# Patient Record
Sex: MALE | Race: WHITE | NOT HISPANIC OR LATINO | Employment: OTHER | URBAN - NONMETROPOLITAN AREA
[De-identification: names, ages, dates, MRNs, and addresses within clinical notes are randomized per-mention and may not be internally consistent; named-entity substitution may affect disease eponyms.]

---

## 2017-01-13 DIAGNOSIS — Z96.642 HISTORY OF TOTAL HIP ARTHROPLASTY, LEFT: Primary | ICD-10-CM

## 2017-01-16 ENCOUNTER — HOSPITAL ENCOUNTER (OUTPATIENT)
Dept: GENERAL RADIOLOGY | Facility: HOSPITAL | Age: 77
Discharge: HOME | End: 2017-01-16
Attending: ORTHOPAEDIC SURGERY | Admitting: ORTHOPAEDIC SURGERY

## 2017-01-16 ENCOUNTER — OFFICE VISIT (OUTPATIENT)
Dept: ORTHOPEDIC SURGERY | Facility: CLINIC | Age: 77
End: 2017-01-16

## 2017-01-16 DIAGNOSIS — Z96.642 HISTORY OF TOTAL HIP ARTHROPLASTY, LEFT: ICD-10-CM

## 2017-01-16 DIAGNOSIS — Z96.641 STATUS POST TOTAL REPLACEMENT OF RIGHT HIP: Primary | ICD-10-CM

## 2017-01-16 DIAGNOSIS — M25.561 KNEE PAIN, RIGHT: ICD-10-CM

## 2017-01-16 DIAGNOSIS — M17.11 PRIMARY OSTEOARTHRITIS OF RIGHT KNEE: ICD-10-CM

## 2017-01-16 PROBLEM — M17.12 PRIMARY OSTEOARTHRITIS OF LEFT KNEE: Status: ACTIVE | Noted: 2017-01-16

## 2017-01-16 PROCEDURE — 73560 X-RAY EXAM OF KNEE 1 OR 2: CPT

## 2017-01-16 PROCEDURE — 99213 OFFICE O/P EST LOW 20 MIN: CPT | Performed by: ORTHOPAEDIC SURGERY

## 2017-01-16 PROCEDURE — 73560 X-RAY EXAM OF KNEE 1 OR 2: CPT | Performed by: RADIOLOGY

## 2017-01-16 NOTE — MR AVS SNAPSHOT
Dustin Jordan   1/16/2017 1:10 PM   Office Visit    Dept Phone:  [phone]   Encounter #:  [id number]    Provider:  Yoel Menjivar MD   Department:  [facility]                Your Full Care Plan              Your Updated Medication List          This list is accurate as of: 1/16/17  1:46 PM.  Always use your most recent med list.                aspirin 81 MG chewable tablet       cetirizine 10 MG tablet   Commonly known as:  zyrTEC       cholecalciferol 400 UNITS tablet   Commonly known as:  VITAMIN D3       fish oil 1000 MG capsule capsule       * HYDROcodone-acetaminophen 7.5-325 MG per tablet   Commonly known as:  NORCO   Take 1 tablet by mouth every 6 (six) hours as needed for pain       * HYDROcodone-acetaminophen 7.5-325 MG per tablet   Commonly known as:  NORCO   Take 1 tablet by mouth every 6 (six) hours as needed for moderate pain (4-6) for up to 40 doses.       metoprolol tartrate 50 MG tablet   Commonly known as:  LOPRESSOR       pantoprazole 40 MG EC tablet   Commonly known as:  PROTONIX       VITEYES AREDS FORMULA/LUTEIN capsule       * Notice:  This list has 2 medication(s) that are the same as other medications prescribed for you. Read the directions carefully, and ask your doctor or other care provider to review them with you.            You Were Diagnosed With        Codes Comments    Status post total replacement of right hip    -  Primary ICD-10-CM: Z96.641  ICD-9-CM: V43.64     Primary osteoarthritis of right knee     ICD-10-CM: M17.11  ICD-9-CM: 715.16       Instructions     None    Patient Instructions History      Upcoming Appointments     Visit Type Date Time Department    FOLLOW UP 1/16/2017  1:10 PM [facility] [place]    XR COR HIP W OR WO PEL 2-3 VIEW LEFT 1/16/2017  1:30 PM [facility]      MyChart Signup     Our records indicate that your [facility] [employer] account has been deactivated. If you would like to  reactivate your account, please email [email or web address] or call [phone] to talk to our [employer] staff.             Other Info from Your Visit           Allergies     No Known Allergies      Reason for Visit     Left Hip - Follow-up     Hip Pain           Vital Signs     Smoking Status                   Never Smoker           Problems and Diagnoses Noted     Degenerative joint disease of lower leg    Status post total replacement of right hip    -  Primary

## 2017-01-16 NOTE — PROGRESS NOTES
Patient: Dustin Jordan  YOB: 1940  Date of Encounter: 01/16/2017        History of Present Illness:     76-year-old white male now 5 months status post left total hip arthroplasty presents today with complaints of several week history of right knee pain with swelling and decreased range of motion. He reports that his left hip is doing well and he is ambulating great with this without complication. He reports that this has alleviated his back pain as well. He states that without acute injury that his right knee began to bother him. He feels as if this is secondary to favoring it while recovering from the hip surgery. He states that for several weeks he had reduction of range of motion and obvious swelling with inability to fully straighten his knee. He states that he has noticed over the last several years that he has had varus deformity or bowing of the lateral knees. He reports pain to the medial aspect of his knee which is significantly better than what it was in the past. He reports a history of an open meniscectomy to the right knee in the 1970s. He denies acute pain today of the right knee.    Physical Exam: 76 y.o. male .  General Appearance:    Well appearing, cooperative, in no acute distress.       Patient is alert and oriented x 3.          Musculoskeletal:   Right Knee Exam   Swelling: None  Effusion: Yes    Tenderness   The patient is experiencing tenderness in the medial joint line.    Range of Motion   Extension: -5  Flexion:     Normal    Muscle Strength   Normal right knee strength    Tests   McMurrays:  Medial - Negative        Lachman:  Anterior - Negative      Drawer:       Anterior - Negative      Varus:  Negative  Valgus: Negative           Radiology:       Xrays: AP, lateral right knee reveal advanced degenerative changes, narrowing of the medial joint space with tricompartmental osteophytes. No acute fractures or dislocations.     Assessment:    ICD-10-CM ICD-9-CM   1. Status  post total replacement of right hip Z96.641 V43.64   2. Primary osteoarthritis of right knee M17.11 715.16       Plan:    Discussed options in regards to conservative Depo-Medrol injection versus Synvisc one injection. He is doing better symptomatically and would like to forego any treatment at this point. He will return back upon returning of his pain and symptoms. He will return upon deciding to undergo viscous supplementation.       Discussion and Summary:    Written by Khadar Espana PA-C, acting as scribe for Dr. Tiara CHINO, Dr. Menjivar personally performed the services described in this documentation, as scribed by Khadar Espana PA-C, in my presence, and is both accurate and complete.      This document was signed by Khadar Espana PA-C January 16, 2017 1:35 PM

## 2021-01-26 ENCOUNTER — IMMUNIZATION (OUTPATIENT)
Dept: VACCINE CLINIC | Facility: HOSPITAL | Age: 81
End: 2021-01-26

## 2021-01-26 PROCEDURE — 0001A: CPT | Performed by: FAMILY MEDICINE

## 2021-01-26 PROCEDURE — 91300 HC SARSCOV02 VAC 30MCG/0.3ML IM: CPT | Performed by: FAMILY MEDICINE

## 2021-02-16 ENCOUNTER — APPOINTMENT (OUTPATIENT)
Dept: VACCINE CLINIC | Facility: HOSPITAL | Age: 81
End: 2021-02-16

## 2021-02-17 ENCOUNTER — IMMUNIZATION (OUTPATIENT)
Dept: VACCINE CLINIC | Facility: HOSPITAL | Age: 81
End: 2021-02-17

## 2021-02-17 PROCEDURE — 0002A: CPT | Performed by: INTERNAL MEDICINE

## 2021-02-17 PROCEDURE — 91300 HC SARSCOV02 VAC 30MCG/0.3ML IM: CPT | Performed by: INTERNAL MEDICINE

## 2021-10-05 DIAGNOSIS — M25.552 LEFT HIP PAIN: Primary | ICD-10-CM

## 2021-10-18 ENCOUNTER — OFFICE VISIT (OUTPATIENT)
Dept: UROLOGY | Facility: CLINIC | Age: 81
End: 2021-10-18

## 2021-10-18 VITALS — BODY MASS INDEX: 23.48 KG/M2 | WEIGHT: 164 LBS | HEIGHT: 70 IN

## 2021-10-18 DIAGNOSIS — R35.0 FREQUENCY OF MICTURITION: Primary | ICD-10-CM

## 2021-10-18 DIAGNOSIS — R31.29 MICROSCOPIC HEMATURIA: ICD-10-CM

## 2021-10-18 LAB
BILIRUB BLD-MCNC: NEGATIVE MG/DL
CLARITY, POC: CLEAR
COLOR UR: YELLOW
EXPIRATION DATE: NORMAL
GLUCOSE UR STRIP-MCNC: NEGATIVE MG/DL
KETONES UR QL: NEGATIVE
LEUKOCYTE EST, POC: NEGATIVE
Lab: 981
NITRITE UR-MCNC: NEGATIVE MG/ML
PH UR: 6 [PH] (ref 5–8)
PROT UR STRIP-MCNC: NEGATIVE MG/DL
RBC # UR STRIP: NEGATIVE /UL
SP GR UR: 1.02 (ref 1–1.03)
UROBILINOGEN UR QL: NORMAL

## 2021-10-18 PROCEDURE — 81003 URINALYSIS AUTO W/O SCOPE: CPT | Performed by: UROLOGY

## 2021-10-18 PROCEDURE — 99203 OFFICE O/P NEW LOW 30 MIN: CPT | Performed by: UROLOGY

## 2021-10-18 NOTE — PROGRESS NOTES
Chief Complaint:          Chief Complaint   Patient presents with   • Blood in Urine     New pt       HPI:   81 y.o. male is a new patient referred with hematuria.  He is on 81 mg of aspirin.  He reports no lower urinary tract symptomatology.  The blood was there 2 months ago and 6 months ago.  He quit tobacco years ago.  Today's urine is negative.  I told him at least to get a upper tract study to be sure were not dealing with any Neoplasia particularly renal neoplasia and I will see him back based on this his exams unremarkable.  He reports no lower urinary tract symptomatology particularly irritative symptoms such as frequency urgency dysuria and obstructive symptomatology particularly dribbling, hesitancy, intermittency.      Past Medical History:        Past Medical History:   Diagnosis Date   • Acid reflux    • Arthritis    • Environmental allergies    • Hypertension     STATES NO HIGH BLOOD PRESSURE. BUT XTRA VEIN IN HEART   • Kidney stones          Current Meds:     Current Outpatient Medications   Medication Sig Dispense Refill   • aspirin 81 MG chewable tablet Chew 81 mg daily.     • cetirizine (ZyrTEC) 10 MG tablet TK 1 T PO QD  3   • cholecalciferol (VITAMIN D3) 400 UNITS tablet Take 400 Units by mouth daily.     • metoprolol tartrate (LOPRESSOR) 50 MG tablet Take 25 mg by mouth 2 (two) times a day.     • Multiple Vitamins-Minerals (VITEYES AREDS FORMULA/LUTEIN) capsule Take 1 capsule by mouth daily.     • Omega-3 Fatty Acids (FISH OIL) 1000 MG capsule capsule Take 1,000 mg by mouth 2 (two) times a day with meals.       No current facility-administered medications for this visit.        Allergies:      No Known Allergies     Past Surgical History:     Past Surgical History:   Procedure Laterality Date   • ESOPHAGEAL DILATATION     • HERNIA REPAIR     • KNEE ARTHROSCOPY Right    • KNEE SURGERY     • TOTAL HIP ARTHROPLASTY Left 8/9/2016    Procedure: TOTAL HIP ARTHROPLASTY;  Surgeon: Yoel Menjivar,  MD;  Location: [facility];  Service:    • TOTAL HIP ARTHROPLASTY           Social History:     Social History     Socioeconomic History   • Marital status:    Tobacco Use   • Smoking status: Never Smoker   • Smokeless tobacco: Current User     Types: Chew   Substance and Sexual Activity   • Alcohol use: No   • Drug use: No   • Sexual activity: Defer       Family History:     Family History   Problem Relation Age of Onset   • Osteoporosis Sister    • Heart disease Brother        Review of Systems:     Review of Systems   Constitutional: Negative.    HENT: Negative.    Eyes: Negative.    Respiratory: Negative.    Cardiovascular: Negative.    Gastrointestinal: Negative.    Endocrine: Negative.    Musculoskeletal: Negative.    Allergic/Immunologic: Negative.    Neurological: Negative.    Hematological: Negative.    Psychiatric/Behavioral: Negative.        Physical Exam:     Physical Exam  Vitals and nursing note reviewed.   Constitutional:       Appearance: He is well-developed.   HENT:      Head: Normocephalic and atraumatic.   Eyes:      Conjunctiva/sclera: Conjunctivae normal.      Pupils: Pupils are equal, round, and reactive to light.   Cardiovascular:      Rate and Rhythm: Normal rate and regular rhythm.      Heart sounds: Normal heart sounds.   Pulmonary:      Effort: Pulmonary effort is normal.      Breath sounds: Normal breath sounds.   Abdominal:      General: Bowel sounds are normal.      Palpations: Abdomen is soft.   Musculoskeletal:         General: Normal range of motion.      Cervical back: Normal range of motion.   Skin:     General: Skin is warm and dry.   Neurological:      Mental Status: He is alert and oriented to person, place, and time.      Deep Tendon Reflexes: Reflexes are normal and symmetric.   Psychiatric:         Behavior: Behavior normal.         Thought Content: Thought content normal.         Judgment: Judgment normal.         I have reviewed the following portions of the patient's  history: allergies, current medications, past family history, past medical history, past social history, past surgical history, problem list and ROS and confirm it's accurate.      Procedure:       Assessment/Plan:   Hematuria-patient was diagnosed with hematuria.  We discussed the significance of microscopic hematuria versus gross hematuria.  We discussed the presence or absence of the type of clotting identified including vermiform clots consistent with ureteral bleeding versus just pink tinged urine versus selena clots.  We discussed the presence of urokinase in the urine which causes the clots to dissolve with time.  We discussed the fact that it takes only a very small amount of blood in the urine to make the urine very red appearing and therefore give one the impression that there is much more blood loss that is really present.  He discussed the use of both an upper and lower tract investigation.  I discussed the fact that an upper tract investigation includes a normal renal ultrasound with a significant risks of missing more subtle lesions.  Progressing to a CT scan without contrast and finally the CT scan with contrast being the gold standard to diagnose the small neoplasms.  We discussed the lower tract investigation consisting of a cystoscopy in many of the cases where the upper tract study is negative.  Also discussed the fact that if there is a contraindication to the use of contrast we would do a noncontrasted study and this also has a chance of missing small lesions.  The specific instance would be cases of diabetes and chronic renal insufficiency.  Discussed the fact that there is about a 96% chance of a negative workup with episodes of microscopic hematuria and with much greater in the face of gross hematuria.  We discussed the fact that this is a non-cumulative test.  In other words if there is hematuria next year I would recommend continuing to work up the condition because of the fact that  neoplasms may be small at the first workup and easily are missed.  I discussed the differential diagnosis of hematuria including trauma, neoplasia, infection, etc.  We discussed the fact that if there is any history of chronic kidney disease or risk factors such as diabetes for contrast a noncontrasted study will be utilized.  We will initiate an investigation.  He had microscopic hematuria on 2 occasions today's urine is negative I will make sure the upper tract is unremarkable I will follow up with him based on this.                  This document has been electronically signed by JYOTI BURROUGHS MD October 18, 2021 13:54 EDT

## 2021-10-20 PROBLEM — R31.29 MICROSCOPIC HEMATURIA: Status: ACTIVE | Noted: 2021-10-20

## 2021-10-25 ENCOUNTER — PATIENT ROUNDING (BHMG ONLY) (OUTPATIENT)
Dept: ORTHOPEDIC SURGERY | Facility: CLINIC | Age: 81
End: 2021-10-25

## 2021-10-25 ENCOUNTER — OFFICE VISIT (OUTPATIENT)
Dept: ORTHOPEDIC SURGERY | Facility: CLINIC | Age: 81
End: 2021-10-25

## 2021-10-25 ENCOUNTER — HOSPITAL ENCOUNTER (OUTPATIENT)
Dept: GENERAL RADIOLOGY | Facility: HOSPITAL | Age: 81
Discharge: HOME | End: 2021-10-25
Admitting: ORTHOPAEDIC SURGERY

## 2021-10-25 VITALS
DIASTOLIC BLOOD PRESSURE: 74 MMHG | BODY MASS INDEX: 23.48 KG/M2 | SYSTOLIC BLOOD PRESSURE: 144 MMHG | WEIGHT: 164.02 LBS | HEART RATE: 61 BPM | HEIGHT: 70 IN

## 2021-10-25 DIAGNOSIS — M25.552 LEFT HIP PAIN: ICD-10-CM

## 2021-10-25 DIAGNOSIS — Z96.642 HISTORY OF TOTAL HIP ARTHROPLASTY, LEFT: Primary | ICD-10-CM

## 2021-10-25 DIAGNOSIS — M70.62 GREATER TROCHANTERIC BURSITIS, LEFT: ICD-10-CM

## 2021-10-25 PROCEDURE — 73502 X-RAY EXAM HIP UNI 2-3 VIEWS: CPT | Performed by: RADIOLOGY

## 2021-10-25 PROCEDURE — 73502 X-RAY EXAM HIP UNI 2-3 VIEWS: CPT

## 2021-10-25 PROCEDURE — 99203 OFFICE O/P NEW LOW 30 MIN: CPT | Performed by: ORTHOPAEDIC SURGERY

## 2021-10-25 RX ORDER — NAPROXEN SODIUM 220 MG
220 TABLET ORAL 2 TIMES DAILY PRN
COMMUNITY
End: 2022-02-03

## 2021-10-25 NOTE — PROGRESS NOTES
New Patient Visit      Patient: Dustin Jordan  YOB: 1940  Date of Encounter: 10/25/2021        Chief Complaint:   Chief Complaint   Patient presents with   • Left Hip - Pain, Initial Evaluation           HPI:   Dustin Jordan, 81 y.o. male, referred by Katt Rand APRN presents with lateral left hip pain after sitting for a while and getting up.  He is known to have chronic low back pain occasionally experiences pain anterior aspect of his left hip.  Underwent left total hip arthroplasty August 2016 he has done extremely well his left hip with current symptoms his only complaint.  He is known to have advanced arthritis of his left knee.      Active Problem List:  Patient Active Problem List   Diagnosis   • Hip pain, left   • Hypertension   • Acid reflux   • Environmental allergies   • Primary osteoarthritis of right knee   • Microscopic hematuria   • History of total hip arthroplasty, left         Past Medical History:  Past Medical History:   Diagnosis Date   • Acid reflux    • Arthritis    • Environmental allergies    • Hypertension     STATES NO HIGH BLOOD PRESSURE. BUT XTRA VEIN IN HEART   • Kidney stones          Past Surgical History:  Past Surgical History:   Procedure Laterality Date   • ESOPHAGEAL DILATATION     • HERNIA REPAIR     • KNEE ARTHROSCOPY Right    • KNEE SURGERY     • TOTAL HIP ARTHROPLASTY Left 8/9/2016    Procedure: TOTAL HIP ARTHROPLASTY;  Surgeon: Yoel Menjivar MD;  Location: [facility];  Service:    • TOTAL HIP ARTHROPLASTY           Family History:  Family History   Problem Relation Age of Onset   • Osteoporosis Sister    • Heart disease Brother          Social History:  Social History     Socioeconomic History   • Marital status:    Tobacco Use   • Smoking status: Never Smoker   • Smokeless tobacco: Current User     Types: Chew   Substance and Sexual Activity   • Alcohol use: No   • Drug use: No   • Sexual activity: Defer     Body mass index is 23.53  "kg/m².      Medications:  Current Outpatient Medications   Medication Sig Dispense Refill   • aspirin 81 MG chewable tablet Chew 162 mg Daily.     • cetirizine (ZyrTEC) 10 MG tablet TK 1 T PO QD  3   • cholecalciferol (VITAMIN D3) 400 UNITS tablet Take 400 Units by mouth daily.     • metoprolol tartrate (LOPRESSOR) 50 MG tablet Take 25 mg by mouth 2 (two) times a day.     • Multiple Vitamins-Minerals (VITEYES AREDS FORMULA/LUTEIN) capsule Take 1 capsule by mouth daily.     • naproxen sodium (ALEVE) 220 MG tablet Take 220 mg by mouth 2 (Two) Times a Day As Needed.     • Omega-3 Fatty Acids (FISH OIL) 1000 MG capsule capsule Take 1,000 mg by mouth 2 (two) times a day with meals.     • Probiotic Product (PROBIOTIC-10 PO) Take  by mouth.       No current facility-administered medications for this visit.         Allergies:  No Known Allergies      Review of Systems:   Review of Systems   Constitutional: Negative.    HENT: Negative.    Eyes: Negative.    Respiratory: Negative.    Cardiovascular: Negative.    Gastrointestinal: Negative.    Endocrine: Negative.    Genitourinary: Negative.    Musculoskeletal: Positive for arthralgias.   Skin: Negative.    Allergic/Immunologic: Negative.    Neurological: Negative.    Hematological: Negative.    Psychiatric/Behavioral: Negative.          Physical Exam:   Physical Exam  GENERAL: 81 y.o. male, alert and oriented X 3 in no acute distress.   Visit Vitals  /74   Pulse 61   Ht 177.8 cm (70\")   Wt 74.4 kg (164 lb 0.4 oz)   BMI 23.53 kg/m²         Musculoskeletal:   Examination left hip demonstrates full motion with no pain full internal and external rotation.  Straight leg raising are negative DIGNA test is negative he has mild tenderness in the region of the left greater trochanteric bursa to palpation.        Radiology/Labs:     XR Hip With or Without Pelvis 2 - 3 View Left    Result Date: 10/25/2021  1. Left hip prosthesis stable 2. Arthritic change in the right hip  This " report was finalized on 10/25/2021 1:22 PM by Dr. Alberto Fong MD.      Radiographs left hip by my review show total hip arthroplasty good position alignment without evidence of loosening.      Assessment & Plan:   81 y.o. male presents with relatively mild lateral left hip pain with no evidence of failure of his left total hip replacement we discussed his options he declined steroid injection today but return in the future if symptoms worsen.        ICD-10-CM ICD-9-CM   1. History of total hip arthroplasty, left  Z96.642 V43.64   2. Greater trochanteric bursitis, left  M70.62 726.5             Cc:   Katt Rand APRN                This document has been electronically signed by Yoel Menjivar MD   October 27, 2021 11:17 EDT

## 2021-10-25 NOTE — PROGRESS NOTES
"October 25, 2021    Hello, may I speak with Dustin Jordan?    My name is Kimberly Muhammad.       I am  with [facility] [place]  [facility]  [address]  [place] [address] [phone].    Before we get started may I verify your date of birth? 1940    I am calling to officially welcome you to our practice and ask about your recent visit. Is this a good time to talk? yes    Tell me about your visit with us. What things went well?  \"Everyone was very nice and the atmosphere was relaxed and pleasant. Both my  and I agree that we were very pleased and wouldn't change a thing\".        We're always looking for ways to make our patients' experiences even better. Do you have recommendations on ways we may improve?  no    Overall were you satisfied with your first visit to our practice? yes       I appreciate you taking the time to speak with me today. Is there anything else I can do for you? no      Thank you, and have a great day.      "

## 2021-11-02 ENCOUNTER — HOSPITAL ENCOUNTER (OUTPATIENT)
Dept: CT IMAGING | Facility: HOSPITAL | Age: 81
Discharge: HOME | End: 2021-11-02
Admitting: UROLOGY

## 2021-11-02 DIAGNOSIS — R35.0 FREQUENCY OF MICTURITION: ICD-10-CM

## 2021-11-02 PROCEDURE — 74176 CT ABD & PELVIS W/O CONTRAST: CPT | Performed by: RADIOLOGY

## 2021-11-02 PROCEDURE — 74176 CT ABD & PELVIS W/O CONTRAST: CPT

## 2021-11-04 ENCOUNTER — OFFICE VISIT (OUTPATIENT)
Dept: UROLOGY | Facility: CLINIC | Age: 81
End: 2021-11-04

## 2021-11-04 VITALS — BODY MASS INDEX: 23.48 KG/M2 | HEIGHT: 70 IN | WEIGHT: 164 LBS

## 2021-11-04 DIAGNOSIS — R31.29 MICROSCOPIC HEMATURIA: Primary | ICD-10-CM

## 2021-11-04 PROCEDURE — 99213 OFFICE O/P EST LOW 20 MIN: CPT | Performed by: UROLOGY

## 2022-02-03 ENCOUNTER — OFFICE VISIT (OUTPATIENT)
Dept: FAMILY MEDICINE CLINIC | Facility: CLINIC | Age: 82
End: 2022-02-03

## 2022-02-03 VITALS
DIASTOLIC BLOOD PRESSURE: 78 MMHG | HEART RATE: 58 BPM | HEIGHT: 70 IN | OXYGEN SATURATION: 98 % | WEIGHT: 165.6 LBS | BODY MASS INDEX: 23.71 KG/M2 | TEMPERATURE: 96.9 F | SYSTOLIC BLOOD PRESSURE: 152 MMHG

## 2022-02-03 DIAGNOSIS — Z13.220 ENCOUNTER FOR LIPID SCREENING FOR CARDIOVASCULAR DISEASE: ICD-10-CM

## 2022-02-03 DIAGNOSIS — I10 ESSENTIAL HYPERTENSION: ICD-10-CM

## 2022-02-03 DIAGNOSIS — E78.49 OTHER HYPERLIPIDEMIA: ICD-10-CM

## 2022-02-03 DIAGNOSIS — Z13.6 ENCOUNTER FOR LIPID SCREENING FOR CARDIOVASCULAR DISEASE: ICD-10-CM

## 2022-02-03 DIAGNOSIS — Z12.5 ENCOUNTER FOR SCREENING FOR MALIGNANT NEOPLASM OF PROSTATE: ICD-10-CM

## 2022-02-03 DIAGNOSIS — E55.9 VITAMIN D DEFICIENCY: Primary | ICD-10-CM

## 2022-02-03 DIAGNOSIS — H35.30 MACULAR DEGENERATION OF BOTH EYES, UNSPECIFIED TYPE: ICD-10-CM

## 2022-02-03 LAB
ALBUMIN SERPL-MCNC: 4.39 G/DL (ref 3.5–5.2)
ALBUMIN/GLOB SERPL: 2.6 G/DL
ALP SERPL-CCNC: 95 U/L (ref 39–117)
ALT SERPL W P-5'-P-CCNC: 18 U/L (ref 1–41)
ANION GAP SERPL CALCULATED.3IONS-SCNC: 9.7 MMOL/L (ref 5–15)
AST SERPL-CCNC: 23 U/L (ref 1–40)
BASOPHILS # BLD AUTO: 0.06 10*3/MM3 (ref 0–0.2)
BASOPHILS NFR BLD AUTO: 0.7 % (ref 0–1.5)
BILIRUB SERPL-MCNC: 0.9 MG/DL (ref 0–1.2)
BUN SERPL-MCNC: 10 MG/DL (ref 8–23)
BUN/CREAT SERPL: 13.7 (ref 7–25)
CALCIUM SPEC-SCNC: 8.9 MG/DL (ref 8.6–10.5)
CHLORIDE SERPL-SCNC: 97 MMOL/L (ref 98–107)
CHOLEST SERPL-MCNC: 188 MG/DL (ref 0–200)
CO2 SERPL-SCNC: 25.3 MMOL/L (ref 22–29)
CREAT SERPL-MCNC: 0.73 MG/DL (ref 0.76–1.27)
DEPRECATED RDW RBC AUTO: 38.7 FL (ref 37–54)
EOSINOPHIL # BLD AUTO: 0.14 10*3/MM3 (ref 0–0.4)
EOSINOPHIL NFR BLD AUTO: 1.6 % (ref 0.3–6.2)
ERYTHROCYTE [DISTWIDTH] IN BLOOD BY AUTOMATED COUNT: 12 % (ref 12.3–15.4)
GFR SERPL CREATININE-BSD FRML MDRD: 103 ML/MIN/1.73
GLOBULIN UR ELPH-MCNC: 1.7 GM/DL
GLUCOSE SERPL-MCNC: 88 MG/DL (ref 65–99)
HCT VFR BLD AUTO: 45.3 % (ref 37.5–51)
HDLC SERPL-MCNC: 59 MG/DL (ref 40–60)
HGB BLD-MCNC: 16.2 G/DL (ref 13–17.7)
IMM GRANULOCYTES # BLD AUTO: 0.1 10*3/MM3 (ref 0–0.05)
IMM GRANULOCYTES NFR BLD AUTO: 1.2 % (ref 0–0.5)
LDLC SERPL CALC-MCNC: 116 MG/DL (ref 0–100)
LDLC/HDLC SERPL: 1.94 {RATIO}
LYMPHOCYTES # BLD AUTO: 0.8 10*3/MM3 (ref 0.7–3.1)
LYMPHOCYTES NFR BLD AUTO: 9.4 % (ref 19.6–45.3)
MCH RBC QN AUTO: 31.2 PG (ref 26.6–33)
MCHC RBC AUTO-ENTMCNC: 35.8 G/DL (ref 31.5–35.7)
MCV RBC AUTO: 87.1 FL (ref 79–97)
MONOCYTES # BLD AUTO: 0.74 10*3/MM3 (ref 0.1–0.9)
MONOCYTES NFR BLD AUTO: 8.7 % (ref 5–12)
NEUTROPHILS NFR BLD AUTO: 6.69 10*3/MM3 (ref 1.7–7)
NEUTROPHILS NFR BLD AUTO: 78.4 % (ref 42.7–76)
NRBC BLD AUTO-RTO: 0 /100 WBC (ref 0–0.2)
PLATELET # BLD AUTO: 163 10*3/MM3 (ref 140–450)
PMV BLD AUTO: 8.5 FL (ref 6–12)
POTASSIUM SERPL-SCNC: 4.6 MMOL/L (ref 3.5–5.2)
PROT SERPL-MCNC: 6.1 G/DL (ref 6–8.5)
RBC # BLD AUTO: 5.2 10*6/MM3 (ref 4.14–5.8)
SODIUM SERPL-SCNC: 132 MMOL/L (ref 136–145)
TRIGL SERPL-MCNC: 73 MG/DL (ref 0–150)
VLDLC SERPL-MCNC: 13 MG/DL (ref 5–40)
WBC NRBC COR # BLD: 8.53 10*3/MM3 (ref 3.4–10.8)

## 2022-02-03 PROCEDURE — 85025 COMPLETE CBC W/AUTO DIFF WBC: CPT | Performed by: FAMILY MEDICINE

## 2022-02-03 PROCEDURE — G0103 PSA SCREENING: HCPCS | Performed by: FAMILY MEDICINE

## 2022-02-03 PROCEDURE — 99204 OFFICE O/P NEW MOD 45 MIN: CPT | Performed by: FAMILY MEDICINE

## 2022-02-03 PROCEDURE — 80061 LIPID PANEL: CPT | Performed by: FAMILY MEDICINE

## 2022-02-03 PROCEDURE — 80053 COMPREHEN METABOLIC PANEL: CPT | Performed by: FAMILY MEDICINE

## 2022-02-03 PROCEDURE — 36415 COLL VENOUS BLD VENIPUNCTURE: CPT | Performed by: FAMILY MEDICINE

## 2022-02-03 RX ORDER — NICOTINE POLACRILEX MINI 2 MG/1
1 LOZENGE ORAL DAILY
Qty: 90 CAPSULE | Refills: 11
Start: 2022-02-03

## 2022-02-03 RX ORDER — ACETAMINOPHEN 160 MG
2000 TABLET,DISINTEGRATING ORAL DAILY
Qty: 30 CAPSULE | Refills: 11
Start: 2022-02-03

## 2022-02-03 RX ORDER — KRILL/OM-3/DHA/EPA/PHOSPHO/AST 500MG-86MG
1 CAPSULE ORAL DAILY
Qty: 90 CAPSULE | Refills: 11
Start: 2022-02-03

## 2022-02-03 RX ORDER — METOPROLOL TARTRATE 50 MG/1
25 TABLET, FILM COATED ORAL 2 TIMES DAILY
Qty: 90 TABLET | Refills: 1 | Status: SHIPPED | OUTPATIENT
Start: 2022-02-03 | End: 2022-05-05 | Stop reason: SDUPTHER

## 2022-02-03 NOTE — PROGRESS NOTES
"[facility]     VITALS: Blood pressure 152/78, pulse 58, temperature 96.9 °F (36.1 °C), height 177.8 cm (70\"), weight 75.1 kg (165 lb 9.6 oz), SpO2 98 %.    Subjective  Chief Complaint  Establish Care    Subjective          History of Present Illness:  Patient is a 81 y.o.  male with medical conditions significant for hypertension and CAD who presents to clinic secondary to establishment of care.     He reports the last time he had his blood pressure checked he was informed that it was a little elevated. The unknown female that the patients blood pressure was checked about 1 to 2 weeks ago.  The patient states that he does not have a cardiologist. The patients previous cardiologist was Dr. Saxena who has passed away. The unknown female states the patient does not check his blood pressure at home. He reports that he has had too much comfort food after he has got too much salt intake.     The patient does take fish oil twice a day. The unknown female reports that he has macular degeneration. The patient states that he has been taking multiple vitamins.    He reports that occasionally he gets a tickle in his throat that is like a bad taste. He denies having heartburn.      No complaints about any of the medications.    The following portions of the patient's history were reviewed and updated as appropriate: allergies, current medications, past family history, past medical history, past social history, past surgical history and problem list.    Past Medical History  Past Medical History:   Diagnosis Date   • Acid reflux    • Arthritis    • Environmental allergies    • Hypertension     STATES NO HIGH BLOOD PRESSURE. BUT XTRA VEIN IN HEART   • Kidney stones    • Macular degeneration        Surgical History  Past Surgical History:   Procedure Laterality Date   • ESOPHAGEAL DILATATION     • HERNIA REPAIR     • KNEE ARTHROSCOPY Right    • KNEE SURGERY     • TOTAL HIP ARTHROPLASTY Left 8/9/2016    Procedure: TOTAL HIP " "ARTHROPLASTY;  Surgeon: Yoel Menjivar MD;  Location: [facility];  Service:    • TOTAL HIP ARTHROPLASTY         Family History  Family History   Problem Relation Age of Onset   • Osteoporosis Sister    • Stroke Sister    • Heart disease Brother    • Other Mother         Arthritis, Gout, Pneumonia   • Stroke Father        Social History  Social History     Socioeconomic History   • Marital status:    Tobacco Use   • Smoking status: Never Smoker   • Smokeless tobacco: Current User     Types: Chew   Substance and Sexual Activity   • Alcohol use: No   • Drug use: No   • Sexual activity: Defer       Objective   Vital Signs:   /78 (BP Location: Right arm, Patient Position: Sitting, Cuff Size: Adult)   Pulse 58   Temp 96.9 °F (36.1 °C)   Ht 177.8 cm (70\")   Wt 75.1 kg (165 lb 9.6 oz)   SpO2 98%   BMI 23.76 kg/m²     Physical Exam     Gen: Patient in NAD. Pleasant and answers appropriately. A&Ox3.    Skin: Warm and dry with normal turgor. No purpura, rashes, or unusual pigmentation noted. Hair is normal in appearance and distribution.    HEENT: NC/AT. No lesions noted. Conjunctiva clear, sclera nonicteric. PERRL. EOMI without nystagmus or strabismus. Fundi appear benign. No hemorrhages or exudates of eyes. Auditory canals are patent bilaterally without lesions. TMs intact,  nonerythematous, nonbulging without lesions. Nasal mucosa pink, nonerythematous, and nonedematous. Frontal and maxillary sinuses are nontender. O/P nonerythematous and moist without exudate.    Neck: Supple without lymph nodes palpated. FROM.     Lungs: CTA B/L without rales, rhonchi, crackles, or wheezes.    Heart: RRR. S1 and S2 normal. No S3 or S4. No MRGT.    Abd: Soft, nontender,nondistended. (+)BSx4 quadrants.     Extrem: No CCE. Radial pulses 2+/4 and equal B/L. FROMx4. No bone, joint, or muscle tenderness noted.    Neuro: No focal motor/sensory deficits.    Procedures       Assessment and Plan    Dustin Foxs is a 81 " y.o. here for establishment of care.    Problem List Items Addressed This Visit     None      Visit Diagnoses     Vitamin D deficiency    -  Primary    Relevant Medications    Cholecalciferol (Vitamin D3) 50 MCG (2000 UT) capsule    Other Relevant Orders    CBC Auto Differential    Comprehensive Metabolic Panel    Macular degeneration of both eyes, unspecified type        Relevant Medications    Multiple Vitamins-Minerals (Viteyes Classic Macular Suppor) capsule    Other Relevant Orders    CBC Auto Differential    Comprehensive Metabolic Panel    Essential hypertension        Relevant Medications    metoprolol tartrate (LOPRESSOR) 50 MG tablet    Other Relevant Orders    CBC Auto Differential    Comprehensive Metabolic Panel  I advised the patient to check his blood pressure at home.  If his blood pressure is high, I will prescribe him a second medication.    Other hyperlipidemia        Relevant Medications    Krill Oil 500 MG capsule    Other Relevant Orders    CBC Auto Differential    Comprehensive Metabolic Panel    Encounter for screening for malignant neoplasm of prostate        Relevant Orders    PSA Screen    Encounter for lipid screening for cardiovascular disease        Relevant Orders    Lipid Panel       Allergies               I advised the patient to use a saline rinse in the morning.         Patient's Body mass index is 23.76 kg/m². indicating that he is within normal range (BMI 18.5-24.9). No BMI management plan needed..                 Follow Up   Return in about 3 months (around 5/3/2022), or LABS.  Findings and plans discussed with patient who verbalizes understanding and agreement. Will followup with patient once results are in. Patient was given instructions and counseling regarding his condition or for health maintenance advice. Please see specific information pulled into the AVS if appropriate.       Transcribed from ambient dictation for Yane Estrada MD by Lottie Hinojosa.  02/03/22    13:55 EST    Patient verbalized consent to the visit recording.  I have personally performed the services described in this document as transcribed by the above individual, and it is both accurate and complete.  Yane Estrada MD  2/21/2022  07:43 EST

## 2022-02-04 LAB — PSA SERPL-MCNC: 0.96 NG/ML (ref 0–4)

## 2022-02-21 ENCOUNTER — TELEPHONE (OUTPATIENT)
Dept: FAMILY MEDICINE CLINIC | Facility: CLINIC | Age: 82
End: 2022-02-21

## 2022-02-21 NOTE — TELEPHONE ENCOUNTER
----- Message from Yane Estrada MD sent at 2/20/2022 11:03 PM EST -----  Labs stable. Okay to either call or send letter to patient. Thanks.    The ASCVD Risk score (Opal FREDY Jr., et al., 2013) failed to calculate for the following reasons:    The 2013 ASCVD risk score is only valid for ages 40 to 79    Mailed letter.     Patient returned missed call.  Notified and expressed understanding.

## 2022-05-02 ENCOUNTER — HOSPITAL ENCOUNTER (OUTPATIENT)
Dept: CARDIOLOGY | Facility: HOSPITAL | Age: 82
Discharge: HOME | End: 2022-05-02
Admitting: FAMILY MEDICINE

## 2022-05-02 ENCOUNTER — OFFICE VISIT (OUTPATIENT)
Dept: FAMILY MEDICINE CLINIC | Facility: CLINIC | Age: 82
End: 2022-05-02

## 2022-05-02 VITALS
BODY MASS INDEX: 23.28 KG/M2 | WEIGHT: 162.6 LBS | OXYGEN SATURATION: 96 % | TEMPERATURE: 97.3 F | DIASTOLIC BLOOD PRESSURE: 68 MMHG | SYSTOLIC BLOOD PRESSURE: 136 MMHG | HEIGHT: 70 IN | HEART RATE: 61 BPM

## 2022-05-02 DIAGNOSIS — R60.0 LEG EDEMA, RIGHT: ICD-10-CM

## 2022-05-02 DIAGNOSIS — R60.0 LEG EDEMA, RIGHT: Primary | ICD-10-CM

## 2022-05-02 PROCEDURE — 93971 EXTREMITY STUDY: CPT

## 2022-05-02 PROCEDURE — 99213 OFFICE O/P EST LOW 20 MIN: CPT | Performed by: FAMILY MEDICINE

## 2022-05-02 PROCEDURE — 93971 EXTREMITY STUDY: CPT | Performed by: RADIOLOGY

## 2022-05-02 RX ORDER — FUROSEMIDE 20 MG/1
20 TABLET ORAL DAILY
Qty: 5 TABLET | Refills: 0 | Status: SHIPPED | OUTPATIENT
Start: 2022-05-02 | End: 2023-02-07

## 2022-05-02 NOTE — PROGRESS NOTES
"[facility]     VITALS: Blood pressure 136/68, pulse 61, temperature 97.3 °F (36.3 °C), height 177.8 cm (70\"), weight 73.8 kg (162 lb 9.6 oz), SpO2 96 %.    Subjective  Chief Complaint  Leg Swelling (R leg swelling. Sore from the knee down, ankle swollen with brusing./)    Subjective          History of Present Illness:  Patient is a 81 y.o.  male with medical conditions significant for hypertension and allergic rhinitis who presents to clinic secondary to acute concern. He has been having right leg swelling. The patient is accompanied by an adult female.    The patient states the swelling is located to the right foot, ankle, and dorsal aspect of his leg and began about 2 days ago. He notes associated intermittent pain and states he feels a pulling sensation in the back of his leg that began about 2 weeks ago. The adult female also notes a change in skin color to the patient's right lower extremity. He notes a history of right knee arthoplasty. He denies any history of blood clots or any recent long trips in a car or airplane rides. He denies any shortness of breath and states he feels fine otherwise.    No complaints about any of the medications.    The following portions of the patient's history were reviewed and updated as appropriate: allergies, current medications, past family history, past medical history, past social history, past surgical history and problem list.    Past Medical History  Past Medical History:   Diagnosis Date   • Acid reflux    • Arthritis    • Environmental allergies    • Hypertension     STATES NO HIGH BLOOD PRESSURE. BUT XTRA VEIN IN HEART   • Kidney stones    • Macular degeneration        Surgical History  Past Surgical History:   Procedure Laterality Date   • ESOPHAGEAL DILATATION     • HERNIA REPAIR     • KNEE ARTHROSCOPY Right    • KNEE SURGERY     • TOTAL HIP ARTHROPLASTY Left 8/9/2016    Procedure: TOTAL HIP ARTHROPLASTY;  Surgeon: Yoel Menjivar MD;  Location: [facility] OR;  " "Service:    • TOTAL HIP ARTHROPLASTY         Family History  Family History   Problem Relation Age of Onset   • Osteoporosis Sister    • Stroke Sister    • Heart disease Brother    • Other Mother         Arthritis, Gout, Pneumonia   • Stroke Father        Social History  Social History     Socioeconomic History   • Marital status:    Tobacco Use   • Smoking status: Never Smoker   • Smokeless tobacco: Current User     Types: Chew   Vaping Use   • Vaping Use: Never used   Substance and Sexual Activity   • Alcohol use: No   • Drug use: No   • Sexual activity: Defer       Objective   Vital Signs:   /68 (BP Location: Right arm, Patient Position: Sitting, Cuff Size: Adult)   Pulse 61   Temp 97.3 °F (36.3 °C)   Ht 177.8 cm (70\")   Wt 73.8 kg (162 lb 9.6 oz)   SpO2 96%   BMI 23.33 kg/m²     Physical Exam     Gen: Patient in NAD. Pleasant and answers appropriately. A&Ox3.    Skin: Warm and dry with normal turgor. No purpura, rashes, or unusual pigmentation noted. Hair is normal in appearance and distribution.    HEENT: NC/AT. No lesions noted. Conjunctiva clear, sclera nonicteric. PERRL. EOMI without nystagmus or strabismus. Fundi appear benign. No hemorrhages or exudates of eyes. Auditory canals are patent bilaterally without lesions. TMs intact,  nonerythematous, nonbulging without lesions. Nasal mucosa pink, nonerythematous, and nonedematous. Frontal and maxillary sinuses are nontender. O/P nonerythematous and moist without exudate.    Neck: Supple without lymph nodes palpated. FROM.     Lungs: Slightly decreased B/L without rales, rhonchi, crackles, or wheezes.    Heart: RRR. S1 and S2 normal. No S3 or S4. No MRGT.    Abd: Soft, nontender,nondistended. (+)BSx4 quadrants.     Extrem: No CC.  +2/4 right lower extremity slightly pitting edema without any erythema.  Radial pulses 2+/4 and equal B/L. FROMx4.  Homans negative.    Neuro: No focal motor/sensory deficits.    Procedures         Assessment and " Plan    Dustin Jordan is a 81 y.o. here for acute concern.    Problem List Items Addressed This Visit    None     Visit Diagnoses     Leg edema, right    -  Primary    Relevant Orders    US venous doppler lower extremity right (duplex)        I will prescribe Lasix it the patient's ultrasound results are negative.    BMI is within normal parameters. No follow-up required.         Follow Up   Return (keep this week's appt).  Findings and plans discussed with patient who verbalizes understanding and agreement. Will followup with patient once results are in. Patient was given instructions and counseling regarding his condition or for health maintenance advice. Please see specific information pulled into the AVS if appropriate.       Transcribed from ambient dictation for Yane Estrada MD by CLYDE HWANG.  05/02/22   15:39 EDT    Patient verbalized consent to the visit recording.

## 2022-05-03 ENCOUNTER — TELEPHONE (OUTPATIENT)
Dept: FAMILY MEDICINE CLINIC | Facility: CLINIC | Age: 82
End: 2022-05-03

## 2022-05-03 NOTE — TELEPHONE ENCOUNTER
You can let him know that there is no clot. There is however a Baker's cyst behind his knee. That's probably what may be causing his symptoms. Try the lasix to get the water off. The posterior knee is not hurting, correct? If it is, we might want to do something to the cyst, but otherwise, we usually leave it alone.    Spoke with wife & she verbalized understanding,no the posterior knee is not hurting.

## 2022-05-03 NOTE — TELEPHONE ENCOUNTER
You can let him know that there is no clot. There is however a Baker's cyst behind his knee. That's probably what may be causing his symptoms. Try the lasix to get the water off. The posterior knee is not hurting, correct? If it is, we might want to do something to the cyst, but otherwise, we usually leave it alone.

## 2022-05-05 ENCOUNTER — OFFICE VISIT (OUTPATIENT)
Dept: FAMILY MEDICINE CLINIC | Facility: CLINIC | Age: 82
End: 2022-05-05

## 2022-05-05 VITALS
BODY MASS INDEX: 23.05 KG/M2 | OXYGEN SATURATION: 100 % | TEMPERATURE: 97.7 F | SYSTOLIC BLOOD PRESSURE: 138 MMHG | HEIGHT: 70 IN | HEART RATE: 58 BPM | WEIGHT: 161 LBS | DIASTOLIC BLOOD PRESSURE: 80 MMHG

## 2022-05-05 DIAGNOSIS — Z00.00 ENCOUNTER FOR SUBSEQUENT ANNUAL WELLNESS VISIT (AWV) IN MEDICARE PATIENT: Primary | ICD-10-CM

## 2022-05-05 DIAGNOSIS — I10 ESSENTIAL HYPERTENSION: ICD-10-CM

## 2022-05-05 PROCEDURE — 1159F MED LIST DOCD IN RCRD: CPT | Performed by: FAMILY MEDICINE

## 2022-05-05 PROCEDURE — 1126F AMNT PAIN NOTED NONE PRSNT: CPT | Performed by: FAMILY MEDICINE

## 2022-05-05 PROCEDURE — 1170F FXNL STATUS ASSESSED: CPT | Performed by: FAMILY MEDICINE

## 2022-05-05 PROCEDURE — G0439 PPPS, SUBSEQ VISIT: HCPCS | Performed by: FAMILY MEDICINE

## 2022-05-05 RX ORDER — METOPROLOL TARTRATE 50 MG/1
25 TABLET, FILM COATED ORAL 2 TIMES DAILY
Qty: 90 TABLET | Refills: 1 | Status: SHIPPED | OUTPATIENT
Start: 2022-05-05 | End: 2022-12-05 | Stop reason: SDUPTHER

## 2022-05-05 NOTE — PROGRESS NOTES
The ABCs of the Annual Wellness Visit  Subsequent Medicare Wellness Visit    Chief Complaint   Patient presents with   • Medicare Wellness-subsequent      Subjective    History of Present Illness:  Dustin Jordan is a 81 y.o. male who presents for a Subsequent Medicare Wellness Visit.    Patient presents with medical conditions significant for hypertension.    The following portions of the patient's history were reviewed and   updated as appropriate: allergies, current medications, past family history, past medical history, past social history, past surgical history and problem list.    Compared to one year ago, the patient feels his physical   health is the same.    Compared to one year ago, the patient feels his mental   health is the same.    Recent Hospitalizations:  He was not admitted to the hospital during the last year.       Current Medical Providers:  Patient Care Team:  Yane Estrada MD as PCP - General (Family Medicine)  Larry Saxena MD (Inactive)    Outpatient Medications Prior to Visit   Medication Sig Dispense Refill   • aspirin 81 MG chewable tablet Chew 162 mg Daily.     • cetirizine (ZyrTEC) 10 MG tablet TK 1 T PO QD  3   • Cholecalciferol (Vitamin D3) 50 MCG (2000 UT) capsule Take 1 capsule by mouth Daily. 30 capsule 11   • furosemide (LASIX) 20 MG tablet Take 1 tablet by mouth Daily. 5 tablet 0   • Krill Oil 500 MG capsule Take 1 capsule by mouth Daily. 90 capsule 11   • Multiple Vitamins-Minerals (VITEYES AREDS FORMULA/LUTEIN) capsule Take 1 capsule by mouth daily.     • metoprolol tartrate (LOPRESSOR) 50 MG tablet Take 0.5 tablets by mouth 2 (Two) Times a Day. 90 tablet 1   • Multiple Vitamins-Minerals (Viteyes Classic Macular Suppor) capsule Take 1 capsule by mouth Daily. 90 capsule 11     No facility-administered medications prior to visit.       No opioid medication identified on active medication list. I have reviewed chart for other potential  high risk medication/s and harmful drug  "interactions in the elderly.          Aspirin is on active medication list. Aspirin use is indicated based on review of current medical condition/s. Pros and cons of this therapy have been discussed today. Benefits of this medication outweigh potential harm.  Patient has been encouraged to continue taking this medication.  .      Patient Active Problem List   Diagnosis   • Hip pain, left   • Hypertension   • Acid reflux   • Environmental allergies   • Primary osteoarthritis of right knee   • Microscopic hematuria   • History of total hip arthroplasty, left     Advance Care Planning  Advance Directive is not on file.  ACP discussion was held with the patient during this visit. Patient does not have an advance directive, information provided.          Objective    Vitals:    05/05/22 0936   BP: 138/80   BP Location: Right arm   Patient Position: Sitting   Cuff Size: Adult   Pulse: 58   Temp: 97.7 °F (36.5 °C)   SpO2: 100%   Weight: 73 kg (161 lb)   Height: 177.8 cm (70\")   PainSc: 0-No pain     BMI Readings from Last 1 Encounters:   05/05/22 23.10 kg/m²   BMI is within normal parameters. No follow-up required.    Does the patient have evidence of cognitive impairment? No    Physical Exam            HEALTH RISK ASSESSMENT    Smoking Status:  Social History     Tobacco Use   Smoking Status Never Smoker   Smokeless Tobacco Current User   • Types: Chew     Alcohol Consumption:  Social History     Substance and Sexual Activity   Alcohol Use No     Fall Risk Screen:    STEADI Fall Risk Assessment was completed, and patient is at LOW risk for falls.Assessment completed on:2/3/2022    Depression Screening:  PHQ-2/PHQ-9 Depression Screening 5/5/2022   Retired PHQ-9 Total Score -   Retired Total Score -   Little Interest or Pleasure in Doing Things 0-->not at all   Feeling Down, Depressed or Hopeless 0-->not at all   PHQ-9: Brief Depression Severity Measure Score 0       Health Habits and Functional and Cognitive " Screening:  Functional & Cognitive Status 5/5/2022   Do you have difficulty preparing food and eating? No   Do you have difficulty bathing yourself, getting dressed or grooming yourself? No   Do you have difficulty using the toilet? No   Do you have difficulty moving around from place to place? No   Do you have trouble with steps or getting out of a bed or a chair? No   Current Diet Well Balanced Diet   Dental Exam Up to date   Eye Exam Up to date   Exercise (times per week) 4 times per week   Current Exercises Include Walking;Yard Work   Do you need help using the phone?  No   Are you deaf or do you have serious difficulty hearing?  No   Do you need help with transportation? No   Do you need help shopping? No   Do you need help preparing meals?  No   Do you need help with housework?  No   Do you need help with laundry? No   Do you need help taking your medications? No   Do you need help managing money? No   Do you ever drive or ride in a car without wearing a seat belt? No   Have you felt unusual stress, anger or loneliness in the last month? No   Who do you live with? Spouse   If you need help, do you have trouble finding someone available to you? No   Do you have difficulty concentrating, remembering or making decisions? No       Age-appropriate Screening Schedule:  Refer to the list below for future screening recommendations based on patient's age, sex and/or medical conditions. Orders for these recommended tests are listed in the plan section. The patient has been provided with a written plan.    Health Maintenance   Topic Date Due   • TDAP/TD VACCINES (1 - Tdap) Never done   • ZOSTER VACCINE (1 of 2) Never done   • INFLUENZA VACCINE  08/01/2022   • LIPID PANEL  02/03/2023              Assessment/Plan   CMS Preventative Services Quick Reference  Risk Factors Identified During Encounter  Fall Risk-High or Moderate  The above risks/problems have been discussed with the patient.  Follow up actions/plans if  indicated are seen below in the Assessment/Plan Section.  Pertinent information has been shared with the patient in the After Visit Summary.    Diagnoses and all orders for this visit:    1. Encounter for subsequent annual wellness visit (AWV) in Medicare patient (Primary)    2. Essential hypertension  -     metoprolol tartrate (LOPRESSOR) 50 MG tablet; Take 0.5 tablets by mouth 2 (Two) Times a Day.  Dispense: 90 tablet; Refill: 1    1. Hypertension.  - The patient's blood pressure is well controlled at this time. He will continue his current medication regimen.    2. Right knee pain.  - The patient will continue his current medication regimen.    Follow Up:   Return in about 6 months (around 11/5/2022).     An After Visit Summary and PPPS were made available to the patient.               Transcribed from ambient dictation for Yane Estrada MD by VICTOR M LAWSON.  05/05/22   13:57 EDT    Patient verbalized consent to the visit recording.

## 2022-05-19 ENCOUNTER — OFFICE VISIT (OUTPATIENT)
Dept: UROLOGY | Facility: CLINIC | Age: 82
End: 2022-05-19

## 2022-05-19 VITALS — BODY MASS INDEX: 23.05 KG/M2 | WEIGHT: 161 LBS | HEIGHT: 70 IN

## 2022-05-19 DIAGNOSIS — R31.9 HEMATURIA, UNSPECIFIED TYPE: Primary | ICD-10-CM

## 2022-05-19 LAB
BILIRUB BLD-MCNC: NEGATIVE MG/DL
CLARITY, POC: ABNORMAL
COLOR UR: YELLOW
EXPIRATION DATE: ABNORMAL
GLUCOSE UR STRIP-MCNC: NEGATIVE MG/DL
KETONES UR QL: NEGATIVE
LEUKOCYTE EST, POC: NEGATIVE
Lab: ABNORMAL
NITRITE UR-MCNC: NEGATIVE MG/ML
PH UR: 6 [PH] (ref 5–8)
PROT UR STRIP-MCNC: NEGATIVE MG/DL
RBC # UR STRIP: NEGATIVE /UL
SP GR UR: 1.01 (ref 1–1.03)
UROBILINOGEN UR QL: NORMAL

## 2022-05-19 PROCEDURE — 81003 URINALYSIS AUTO W/O SCOPE: CPT | Performed by: UROLOGY

## 2022-05-19 PROCEDURE — 99213 OFFICE O/P EST LOW 20 MIN: CPT | Performed by: UROLOGY

## 2022-05-20 PROBLEM — R31.9 HEMATURIA: Status: ACTIVE | Noted: 2022-05-20

## 2022-05-20 NOTE — PROGRESS NOTES
Chief Complaint:          Chief Complaint   Patient presents with   • Follow-up   • Blood in Urine       HPI:   81 y.o. male returns today.  He is a follow-up of hematuria today urinalysis is entirely negative he has minimal symptomatology he was diagnosed with a right leg Baker's cyst we reviewed his CT again I will see him back on a as needed basis.      Past Medical History:        Past Medical History:   Diagnosis Date   • Acid reflux    • Arthritis    • Environmental allergies    • Hypertension     STATES NO HIGH BLOOD PRESSURE. BUT XTRA VEIN IN HEART   • Kidney stones    • Macular degeneration          Current Meds:     Current Outpatient Medications   Medication Sig Dispense Refill   • aspirin 81 MG chewable tablet Chew 162 mg Daily.     • cetirizine (ZyrTEC) 10 MG tablet TK 1 T PO QD  3   • Cholecalciferol (Vitamin D3) 50 MCG (2000 UT) capsule Take 1 capsule by mouth Daily. 30 capsule 11   • furosemide (LASIX) 20 MG tablet Take 1 tablet by mouth Daily. 5 tablet 0   • Krill Oil 500 MG capsule Take 1 capsule by mouth Daily. 90 capsule 11   • metoprolol tartrate (LOPRESSOR) 50 MG tablet Take 0.5 tablets by mouth 2 (Two) Times a Day. 90 tablet 1   • Multiple Vitamins-Minerals (VITEYES AREDS FORMULA/LUTEIN) capsule Take 1 capsule by mouth daily.     • Multiple Vitamins-Minerals (Viteyes Classic Macular Suppor) capsule Take 1 capsule by mouth Daily. 90 capsule 11     No current facility-administered medications for this visit.        Allergies:      No Known Allergies     Past Surgical History:     Past Surgical History:   Procedure Laterality Date   • ESOPHAGEAL DILATATION     • HERNIA REPAIR     • KNEE ARTHROSCOPY Right    • KNEE SURGERY     • TOTAL HIP ARTHROPLASTY Left 8/9/2016    Procedure: TOTAL HIP ARTHROPLASTY;  Surgeon: Yoel Menjivar MD;  Location: [facility];  Service:    • TOTAL HIP ARTHROPLASTY           Social History:     Social History     Socioeconomic History   • Marital status:     Tobacco Use   • Smoking status: Never Smoker   • Smokeless tobacco: Current User     Types: Chew   Vaping Use   • Vaping Use: Never used   Substance and Sexual Activity   • Alcohol use: No   • Drug use: No   • Sexual activity: Defer       Family History:     Family History   Problem Relation Age of Onset   • Osteoporosis Sister    • Stroke Sister    • Heart disease Brother    • Other Mother         Arthritis, Gout, Pneumonia   • Stroke Father        Review of Systems:     Review of Systems   Constitutional: Negative.    HENT: Negative.    Eyes: Negative.    Respiratory: Negative.    Cardiovascular: Negative.    Gastrointestinal: Negative.    Endocrine: Negative.    Musculoskeletal: Negative.    Allergic/Immunologic: Negative.    Neurological: Negative.    Hematological: Negative.    Psychiatric/Behavioral: Negative.        Physical Exam:     Physical Exam  Vitals and nursing note reviewed.   Constitutional:       Appearance: He is well-developed.   HENT:      Head: Normocephalic and atraumatic.   Eyes:      Conjunctiva/sclera: Conjunctivae normal.      Pupils: Pupils are equal, round, and reactive to light.   Cardiovascular:      Rate and Rhythm: Normal rate and regular rhythm.      Heart sounds: Normal heart sounds.   Pulmonary:      Effort: Pulmonary effort is normal.      Breath sounds: Normal breath sounds.   Abdominal:      General: Bowel sounds are normal.      Palpations: Abdomen is soft.   Musculoskeletal:         General: Normal range of motion.      Cervical back: Normal range of motion.   Skin:     General: Skin is warm and dry.   Neurological:      Mental Status: He is alert and oriented to person, place, and time.      Deep Tendon Reflexes: Reflexes are normal and symmetric.   Psychiatric:         Behavior: Behavior normal.         Thought Content: Thought content normal.         Judgment: Judgment normal.         I have reviewed the following portions of the patient's history: Allergies, current  medications, past family history, past medical history, past social history, past surgical history, problem list, and ROS and confirm it is accurate.      Procedure:       Assessment/Plan:   Microscopic hematuria-negative upper tract other than a large hiatal hernia today's urinalysis completely negative there is no indication for lower tract investigation at this time I will see him back on an as needed basis.                  This document has been electronically signed by JYOTI BURROUGHS MD May 20, 2022 08:33 EDT

## 2022-11-21 ENCOUNTER — OFFICE VISIT (OUTPATIENT)
Dept: FAMILY MEDICINE CLINIC | Facility: CLINIC | Age: 82
End: 2022-11-21

## 2022-11-21 VITALS
HEART RATE: 99 BPM | TEMPERATURE: 97.7 F | OXYGEN SATURATION: 97 % | WEIGHT: 162.2 LBS | DIASTOLIC BLOOD PRESSURE: 70 MMHG | BODY MASS INDEX: 23.22 KG/M2 | HEIGHT: 70 IN | SYSTOLIC BLOOD PRESSURE: 138 MMHG

## 2022-11-21 DIAGNOSIS — U07.1 COVID-19: ICD-10-CM

## 2022-11-21 DIAGNOSIS — R50.9 FEVER, UNSPECIFIED FEVER CAUSE: ICD-10-CM

## 2022-11-21 DIAGNOSIS — J06.9 ACUTE URI: Primary | ICD-10-CM

## 2022-11-21 LAB
EXPIRATION DATE: ABNORMAL
FLUAV AG UPPER RESP QL IA.RAPID: NOT DETECTED
FLUBV AG UPPER RESP QL IA.RAPID: NOT DETECTED
INTERNAL CONTROL: ABNORMAL
Lab: ABNORMAL
SARS-COV-2 AG UPPER RESP QL IA.RAPID: DETECTED

## 2022-11-21 PROCEDURE — 87428 SARSCOV & INF VIR A&B AG IA: CPT | Performed by: FAMILY MEDICINE

## 2022-11-21 PROCEDURE — 99213 OFFICE O/P EST LOW 20 MIN: CPT | Performed by: FAMILY MEDICINE

## 2022-11-21 NOTE — PROGRESS NOTES
"Chief Complaint  fever    [facility] presents to [facility]  Fever   This is a new problem. The current episode started in the past 7 days. The problem has been unchanged. The maximum temperature noted was 101 to 101.9 F. Associated symptoms include congestion, coughing, diarrhea, muscle aches, nausea and a sore throat. He has tried NSAIDs for the symptoms. The treatment provided moderate relief.       Review of Systems   Constitutional: Positive for fever.   HENT: Positive for congestion and sore throat.    Respiratory: Positive for cough.    Gastrointestinal: Positive for diarrhea and nausea.         Objective   Vital Signs:   /70 (BP Location: Right arm, Patient Position: Sitting, Cuff Size: Adult)   Pulse 99   Temp 97.7 °F (36.5 °C) (Temporal)   Ht 177.8 cm (70\")   Wt 73.6 kg (162 lb 3.2 oz)   SpO2 97%   BMI 23.27 kg/m²     Physical Exam  Constitutional:       General: He is not in acute distress.     Appearance: Normal appearance. He is well-developed and well-groomed. He is not ill-appearing, toxic-appearing or diaphoretic.   HENT:      Head: Normocephalic.      Nose: Congestion and rhinorrhea present.      Mouth/Throat:      Mouth: Mucous membranes are moist.      Pharynx: Oropharynx is clear. No oropharyngeal exudate or posterior oropharyngeal erythema.   Eyes:      General: Lids are normal.         Right eye: No discharge.         Left eye: No discharge.      Extraocular Movements: Extraocular movements intact.      Pupils: Pupils are equal, round, and reactive to light.   Neck:      Vascular: No carotid bruit.   Cardiovascular:      Rate and Rhythm: Normal rate.      Pulses: Normal pulses.      Heart sounds: No murmur heard.    No friction rub. No gallop.   Pulmonary:      Effort: Pulmonary effort is normal. No respiratory distress.      Breath sounds: No stridor. No wheezing, rhonchi or rales.   Chest:      Chest wall: No tenderness. "   Abdominal:      General: There is no distension.      Palpations: There is no mass.      Tenderness: There is no abdominal tenderness. There is no right CVA tenderness, left CVA tenderness, guarding or rebound.      Hernia: No hernia is present.   Musculoskeletal:         General: No swelling or tenderness. Normal range of motion.      Cervical back: Normal range of motion and neck supple. No rigidity or tenderness.      Right lower leg: No edema.      Left lower leg: No edema.   Lymphadenopathy:      Cervical: No cervical adenopathy.   Skin:     General: Skin is warm.      Capillary Refill: Capillary refill takes less than 2 seconds.      Coloration: Skin is not jaundiced.      Findings: No bruising, erythema or rash.   Neurological:      General: No focal deficit present.      Mental Status: He is alert and oriented to person, place, and time.      Motor: Motor function is intact. No weakness.      Coordination: Coordination is intact.      Gait: Gait is intact. Gait normal.   Psychiatric:         Attention and Perception: Attention normal.         Mood and Affect: Mood normal.         Speech: Speech normal.         Behavior: Behavior normal.         Cognition and Memory: Cognition normal.         Judgment: Judgment normal.        Result Review :                 Assessment and Plan    Diagnoses and all orders for this visit:    1. Acute URI (Primary)    2. Fever, unspecified fever cause  -     POCT SARS-CoV-2 Antigen DELMA + Flu    3. COVID-19  -     Nirmatrelvir&Ritonavir 300/100 (PAXLOVID) 20 x 150 MG & 10 x 100MG tablet therapy pack tablet; Take 3 tablets by mouth 2 (Two) Times a Day for 5 days.  Dispense: 30 tablet; Refill: 0      Patient's Body mass index is 23.27 kg/m². indicating that he is within normal range (BMI 18.5-24.9). No BMI management plan needed..    Follow Up   No follow-ups on file.  Patient was given instructions and counseling regarding his condition or for health maintenance advice. Please  see specific information pulled into the AVS if appropriate.     This document has been electronically signed by MARY JO Church  November 21, 2022 13:40 EST

## 2022-12-05 ENCOUNTER — OFFICE VISIT (OUTPATIENT)
Dept: FAMILY MEDICINE CLINIC | Facility: CLINIC | Age: 82
End: 2022-12-05

## 2022-12-05 VITALS
TEMPERATURE: 97.8 F | BODY MASS INDEX: 23.71 KG/M2 | HEIGHT: 70 IN | SYSTOLIC BLOOD PRESSURE: 148 MMHG | DIASTOLIC BLOOD PRESSURE: 70 MMHG | HEART RATE: 58 BPM | RESPIRATION RATE: 18 BRPM | WEIGHT: 165.6 LBS | OXYGEN SATURATION: 95 %

## 2022-12-05 DIAGNOSIS — Z91.09 ENVIRONMENTAL ALLERGIES: ICD-10-CM

## 2022-12-05 DIAGNOSIS — I10 ESSENTIAL HYPERTENSION: ICD-10-CM

## 2022-12-05 DIAGNOSIS — U07.1 COVID-19: Primary | ICD-10-CM

## 2022-12-05 LAB
EXPIRATION DATE: NORMAL
FLUAV AG UPPER RESP QL IA.RAPID: NOT DETECTED
FLUBV AG UPPER RESP QL IA.RAPID: NOT DETECTED
INTERNAL CONTROL: NORMAL
Lab: NORMAL
SARS-COV-2 AG UPPER RESP QL IA.RAPID: NOT DETECTED

## 2022-12-05 PROCEDURE — 87428 SARSCOV & INF VIR A&B AG IA: CPT | Performed by: FAMILY MEDICINE

## 2022-12-05 PROCEDURE — 99214 OFFICE O/P EST MOD 30 MIN: CPT | Performed by: FAMILY MEDICINE

## 2022-12-05 RX ORDER — CETIRIZINE HYDROCHLORIDE 10 MG/1
5 TABLET ORAL DAILY
Qty: 30 TABLET | Refills: 3 | Status: SHIPPED | OUTPATIENT
Start: 2022-12-05

## 2022-12-05 RX ORDER — METOPROLOL TARTRATE 50 MG/1
25 TABLET, FILM COATED ORAL 2 TIMES DAILY
Qty: 90 TABLET | Refills: 1 | Status: SHIPPED | OUTPATIENT
Start: 2022-12-05

## 2022-12-06 NOTE — PROGRESS NOTES
"Chief Complaint  Hypertension    Subjective          [facility] presents to [facility]  Hypertension  This is a chronic problem. The current episode started more than 1 year ago. Pertinent negatives include no chest pain, palpitations or peripheral edema. Current antihypertension treatment includes beta blockers.     Would like to be rechecked for covid as he has an upcoming appt.   Review of Systems   Cardiovascular: Negative for chest pain and palpitations.         Objective   Vital Signs:   /70 (BP Location: Right arm, Patient Position: Sitting)   Pulse 58   Temp 97.8 °F (36.6 °C)   Resp 18   Ht 177.8 cm (70\")   Wt 75.1 kg (165 lb 9.6 oz)   SpO2 95%   BMI 23.76 kg/m²     Physical Exam  Constitutional:       General: He is not in acute distress.     Appearance: Normal appearance. He is well-developed and well-groomed. He is not ill-appearing, toxic-appearing or diaphoretic.   HENT:      Head: Normocephalic.      Nose: Nose normal. No congestion or rhinorrhea.      Mouth/Throat:      Mouth: Mucous membranes are moist.      Pharynx: Oropharynx is clear. No oropharyngeal exudate or posterior oropharyngeal erythema.   Eyes:      General: Lids are normal.         Right eye: No discharge.         Left eye: No discharge.      Extraocular Movements: Extraocular movements intact.      Pupils: Pupils are equal, round, and reactive to light.   Neck:      Vascular: No carotid bruit.   Cardiovascular:      Rate and Rhythm: Normal rate and regular rhythm.      Pulses: Normal pulses.      Heart sounds: Normal heart sounds. No murmur heard.    No friction rub. No gallop.   Pulmonary:      Effort: Pulmonary effort is normal. No respiratory distress.      Breath sounds: Normal breath sounds. No stridor. No wheezing, rhonchi or rales.   Chest:      Chest wall: No tenderness.   Abdominal:      General: Bowel sounds are normal. There is no distension.      Palpations: Abdomen is soft. " There is no mass.      Tenderness: There is no abdominal tenderness. There is no right CVA tenderness, left CVA tenderness, guarding or rebound.      Hernia: No hernia is present.   Musculoskeletal:         General: No swelling or tenderness. Normal range of motion.      Cervical back: Normal range of motion and neck supple. No rigidity or tenderness.      Right lower leg: No edema.      Left lower leg: No edema.   Lymphadenopathy:      Cervical: No cervical adenopathy.   Skin:     General: Skin is warm.      Capillary Refill: Capillary refill takes less than 2 seconds.      Coloration: Skin is not jaundiced.      Findings: No bruising, erythema or rash.   Neurological:      General: No focal deficit present.      Mental Status: He is alert and oriented to person, place, and time.      Motor: Motor function is intact. No weakness.      Coordination: Coordination is intact.      Gait: Gait is intact. Gait normal.   Psychiatric:         Attention and Perception: Attention normal.         Mood and Affect: Mood normal.         Speech: Speech normal.         Behavior: Behavior normal.         Cognition and Memory: Cognition normal.         Judgment: Judgment normal.        Result Review :                 Assessment and Plan    Diagnoses and all orders for this visit:    1. COVID-19 (Primary)  -     POCT SARS-CoV-2 Antigen DELMA + Flu    2. Essential hypertension  -     metoprolol tartrate (LOPRESSOR) 50 MG tablet; Take 0.5 tablets by mouth 2 (Two) Times a Day.  Dispense: 90 tablet; Refill: 1  -     CBC Auto Differential; Future  -     Comprehensive Metabolic Panel; Future  -     Lipid Panel; Future    3. Environmental allergies  -     cetirizine (zyrTEC) 10 MG tablet; Take 0.5 tablets by mouth Daily.  Dispense: 30 tablet; Refill: 3      Patient's Body mass index is 23.76 kg/m². indicating that he is within normal range (BMI 18.5-24.9). No BMI management plan needed..    Follow Up   No follow-ups on file.  Patient was given  instructions and counseling regarding his condition or for health maintenance advice. Please see specific information pulled into the AVS if appropriate.     This document has been electronically signed by MARY JO Church  December 6, 2022 13:27 EST

## 2022-12-29 ENCOUNTER — TELEPHONE (OUTPATIENT)
Dept: FAMILY MEDICINE CLINIC | Facility: CLINIC | Age: 82
End: 2022-12-29

## 2022-12-29 NOTE — TELEPHONE ENCOUNTER
Called and spoke with patient's wife in regards to overdue lab orders.  She states they plan to have them drawn soon.

## 2023-01-09 ENCOUNTER — LAB (OUTPATIENT)
Dept: FAMILY MEDICINE CLINIC | Facility: CLINIC | Age: 83
End: 2023-01-09
Payer: MEDICARE

## 2023-01-09 DIAGNOSIS — I10 ESSENTIAL HYPERTENSION: ICD-10-CM

## 2023-01-09 PROCEDURE — 80053 COMPREHEN METABOLIC PANEL: CPT | Performed by: FAMILY MEDICINE

## 2023-01-09 PROCEDURE — 85025 COMPLETE CBC W/AUTO DIFF WBC: CPT | Performed by: FAMILY MEDICINE

## 2023-01-09 PROCEDURE — 80061 LIPID PANEL: CPT | Performed by: FAMILY MEDICINE

## 2023-01-10 ENCOUNTER — TELEPHONE (OUTPATIENT)
Dept: FAMILY MEDICINE CLINIC | Facility: CLINIC | Age: 83
End: 2023-01-10
Payer: MEDICARE

## 2023-01-10 LAB
ALBUMIN SERPL-MCNC: 4.3 G/DL (ref 3.5–5.2)
ALBUMIN/GLOB SERPL: 2.4 G/DL
ALP SERPL-CCNC: 81 U/L (ref 39–117)
ALT SERPL W P-5'-P-CCNC: 16 U/L (ref 1–41)
ANION GAP SERPL CALCULATED.3IONS-SCNC: 10.9 MMOL/L (ref 5–15)
AST SERPL-CCNC: 21 U/L (ref 1–40)
BASOPHILS # BLD AUTO: 0.03 10*3/MM3 (ref 0–0.2)
BASOPHILS NFR BLD AUTO: 0.5 % (ref 0–1.5)
BILIRUB SERPL-MCNC: 0.6 MG/DL (ref 0–1.2)
BUN SERPL-MCNC: 13 MG/DL (ref 8–23)
BUN/CREAT SERPL: 16 (ref 7–25)
CALCIUM SPEC-SCNC: 9.1 MG/DL (ref 8.6–10.5)
CHLORIDE SERPL-SCNC: 101 MMOL/L (ref 98–107)
CHOLEST SERPL-MCNC: 191 MG/DL (ref 0–200)
CO2 SERPL-SCNC: 27.1 MMOL/L (ref 22–29)
CREAT SERPL-MCNC: 0.81 MG/DL (ref 0.76–1.27)
DEPRECATED RDW RBC AUTO: 42.9 FL (ref 37–54)
EGFRCR SERPLBLD CKD-EPI 2021: 88.6 ML/MIN/1.73
EOSINOPHIL # BLD AUTO: 0.16 10*3/MM3 (ref 0–0.4)
EOSINOPHIL NFR BLD AUTO: 2.9 % (ref 0.3–6.2)
ERYTHROCYTE [DISTWIDTH] IN BLOOD BY AUTOMATED COUNT: 12.9 % (ref 12.3–15.4)
GLOBULIN UR ELPH-MCNC: 1.8 GM/DL
GLUCOSE SERPL-MCNC: 81 MG/DL (ref 65–99)
HCT VFR BLD AUTO: 45.7 % (ref 37.5–51)
HDLC SERPL-MCNC: 55 MG/DL (ref 40–60)
HGB BLD-MCNC: 15.6 G/DL (ref 13–17.7)
IMM GRANULOCYTES # BLD AUTO: 0.08 10*3/MM3 (ref 0–0.05)
IMM GRANULOCYTES NFR BLD AUTO: 1.4 % (ref 0–0.5)
LDLC SERPL CALC-MCNC: 123 MG/DL (ref 0–100)
LDLC/HDLC SERPL: 2.21 {RATIO}
LYMPHOCYTES # BLD AUTO: 0.78 10*3/MM3 (ref 0.7–3.1)
LYMPHOCYTES NFR BLD AUTO: 13.9 % (ref 19.6–45.3)
MCH RBC QN AUTO: 31 PG (ref 26.6–33)
MCHC RBC AUTO-ENTMCNC: 34.1 G/DL (ref 31.5–35.7)
MCV RBC AUTO: 90.9 FL (ref 79–97)
MONOCYTES # BLD AUTO: 0.59 10*3/MM3 (ref 0.1–0.9)
MONOCYTES NFR BLD AUTO: 10.5 % (ref 5–12)
NEUTROPHILS NFR BLD AUTO: 3.97 10*3/MM3 (ref 1.7–7)
NEUTROPHILS NFR BLD AUTO: 70.8 % (ref 42.7–76)
NRBC BLD AUTO-RTO: 0 /100 WBC (ref 0–0.2)
PLATELET # BLD AUTO: 162 10*3/MM3 (ref 140–450)
PMV BLD AUTO: 9.1 FL (ref 6–12)
POTASSIUM SERPL-SCNC: 4.8 MMOL/L (ref 3.5–5.2)
PROT SERPL-MCNC: 6.1 G/DL (ref 6–8.5)
RBC # BLD AUTO: 5.03 10*6/MM3 (ref 4.14–5.8)
SODIUM SERPL-SCNC: 139 MMOL/L (ref 136–145)
TRIGL SERPL-MCNC: 72 MG/DL (ref 0–150)
VLDLC SERPL-MCNC: 13 MG/DL (ref 5–40)
WBC NRBC COR # BLD: 5.61 10*3/MM3 (ref 3.4–10.8)

## 2023-01-10 NOTE — TELEPHONE ENCOUNTER
----- Message from MARY JO Church sent at 1/10/2023  8:08 AM EST -----  Please let patient know results are normal. Thank you.

## 2023-02-07 ENCOUNTER — OFFICE VISIT (OUTPATIENT)
Dept: FAMILY MEDICINE CLINIC | Facility: CLINIC | Age: 83
End: 2023-02-07
Payer: MEDICARE

## 2023-02-07 VITALS
SYSTOLIC BLOOD PRESSURE: 132 MMHG | HEIGHT: 70 IN | WEIGHT: 168.8 LBS | OXYGEN SATURATION: 98 % | HEART RATE: 64 BPM | DIASTOLIC BLOOD PRESSURE: 80 MMHG | TEMPERATURE: 96.8 F | BODY MASS INDEX: 24.17 KG/M2

## 2023-02-07 DIAGNOSIS — Z20.822 EXPOSURE TO COVID-19 VIRUS: Primary | ICD-10-CM

## 2023-02-07 PROCEDURE — 87428 SARSCOV & INF VIR A&B AG IA: CPT | Performed by: NURSE PRACTITIONER

## 2023-02-07 PROCEDURE — 99213 OFFICE O/P EST LOW 20 MIN: CPT | Performed by: NURSE PRACTITIONER

## 2023-02-07 NOTE — PROGRESS NOTES
Chief Complaint  Exposure To Known Illness (Covid exposure)    Stu Jordan is a 82 y.o. male who presents today to [facility] for initial evaluation     HPI:   History of Present Illness    Presents for covid test. Was exposed to covid about 10 days ago. Denies any associated symptoms.  No other issues or concerns at this time.    The following portions of the patient's history were reviewed and updated as appropriate: allergies, current medications, past family history, past medical history, past social history, past surgical history and problem list.    Objective     Allergy:   No Known Allergies     Current Medications:   Current Outpatient Medications   Medication Sig Dispense Refill   • aspirin 81 MG chewable tablet Chew 162 mg Daily.     • cetirizine (zyrTEC) 10 MG tablet Take 0.5 tablets by mouth Daily. 30 tablet 3   • Cholecalciferol (Vitamin D3) 50 MCG (2000 UT) capsule Take 1 capsule by mouth Daily. 30 capsule 11   • Krill Oil 500 MG capsule Take 1 capsule by mouth Daily. 90 capsule 11   • metoprolol tartrate (LOPRESSOR) 50 MG tablet Take 0.5 tablets by mouth 2 (Two) Times a Day. 90 tablet 1   • Multiple Vitamins-Minerals (VITEYES AREDS FORMULA/LUTEIN) capsule Take 1 capsule by mouth daily.     • Multiple Vitamins-Minerals (Viteyes Classic Macular Suppor) capsule Take 1 capsule by mouth Daily. 90 capsule 11     No current facility-administered medications for this visit.       Past Medical History:  Past Medical History:   Diagnosis Date   • Acid reflux    • Arthritis    • Environmental allergies    • Hypertension     STATES NO HIGH BLOOD PRESSURE. BUT XTRA VEIN IN HEART   • Kidney stones    • Macular degeneration        Past Surgical History:  Past Surgical History:   Procedure Laterality Date   • ESOPHAGEAL DILATATION     • HERNIA REPAIR     • KNEE ARTHROSCOPY Right    • KNEE SURGERY     • TOTAL HIP ARTHROPLASTY Left 8/9/2016    Procedure: TOTAL HIP  "ARTHROPLASTY;  Surgeon: Yoel Menjivar MD;  Location: [facility];  Service:    • TOTAL HIP ARTHROPLASTY         Social History:  Social History     Socioeconomic History   • Marital status:    Tobacco Use   • Smoking status: Never   • Smokeless tobacco: Current     Types: Chew   Vaping Use   • Vaping Use: Never used   Substance and Sexual Activity   • Alcohol use: No   • Drug use: No   • Sexual activity: Defer       Family History:  Family History   Problem Relation Age of Onset   • Osteoporosis Sister    • Stroke Sister    • Heart disease Brother    • Other Mother         Arthritis, Gout, Pneumonia   • Stroke Father        Review of Systems:  Review of Systems   Constitutional: Negative for fever.   Respiratory: Negative for cough and shortness of breath.        Vital Signs:   /80 (BP Location: Right arm, Patient Position: Sitting)   Pulse 64   Temp 96.8 °F (36 °C)   Ht 177.8 cm (70\")   Wt 76.6 kg (168 lb 12.8 oz)   SpO2 98%   BMI 24.22 kg/m²  RR: 17    Physical Exam:  Physical Exam  Vitals and nursing note reviewed.   Constitutional:       General: He is not in acute distress.     Appearance: Normal appearance. He is not ill-appearing or toxic-appearing.   HENT:      Head: Normocephalic.      Right Ear: Tympanic membrane, ear canal and external ear normal.      Left Ear: Tympanic membrane, ear canal and external ear normal.      Nose: Nose normal.      Mouth/Throat:      Mouth: Mucous membranes are moist.      Pharynx: Oropharynx is clear.   Eyes:      Conjunctiva/sclera: Conjunctivae normal.   Cardiovascular:      Rate and Rhythm: Normal rate and regular rhythm.      Heart sounds: Normal heart sounds.   Pulmonary:      Effort: Pulmonary effort is normal. No respiratory distress.      Breath sounds: Normal breath sounds.   Abdominal:      General: Bowel sounds are normal.      Palpations: Abdomen is soft.   Lymphadenopathy:      Cervical: No cervical adenopathy.   Skin:     General: Skin " is warm and dry.      Coloration: Skin is not pale.   Neurological:      Mental Status: He is alert and oriented to person, place, and time.   Psychiatric:         Mood and Affect: Mood normal.         Behavior: Behavior normal.         Thought Content: Thought content normal.         Judgment: Judgment normal.                  Assessment and Plan   Diagnoses and all orders for this visit:    1. Exposure to COVID-19 virus (Primary)  -     POCT SARS-CoV-2 Antigen DELMA + Flu    Follow-up for retesting if symptoms occur.    Discussed possible differential diagnoses, testing, treatment, recommended non-pharmacological interventions, risks, warning signs to monitor for that would indicate need for follow-up in clinic or ER. If no improvement with these regimens or you have new or worsening symptoms follow-up. Patient verbalizes understanding and agreement with plan of care. Denies further needs or concerns.     Patient was given instructions and counseling regarding her condition and for health maintenance advised.    BMI is within normal parameters. No other follow-up for BMI required.       I spent 20 minutes caring for patient on this date of service. This time includes time spent by me in the following activities: preparing for the visit, reviewing tests, obtaining and/or reviewing a separately obtained history, performing a medically appropriate examination and/or evaluation, counseling and educating the patient/family/caregiver, ordering medications, tests, or procedures and documenting information in the medical record    Meds ordered during this visit:  No orders of the defined types were placed in this encounter.      Patient Instructions:  Patient instructions given for the following visit diagnosis:    ICD-10-CM ICD-9-CM   1. Exposure to COVID-19 virus  Z20.822 V01.79       Follow Up   Return if symptoms worsen or fail to improve.        This document has been electronically signed by Lexi Licea  APRN  February 7, 2023 15:12 EST    Patient was given instructions and counseling regarding his condition or for health maintenance advice. Please see specific information pulled into the AVS if appropriate.     Part of this note may be an electronic transcription/translation of spoken language to printed text using the Dragon Dictation System.

## 2023-03-13 ENCOUNTER — OFFICE VISIT (OUTPATIENT)
Dept: FAMILY MEDICINE CLINIC | Facility: CLINIC | Age: 83
End: 2023-03-13
Payer: MEDICARE

## 2023-03-13 DIAGNOSIS — H35.3223 EXUDATIVE AGE-RELATED MACULAR DEGENERATION, LEFT EYE, WITH INACTIVE SCAR: ICD-10-CM

## 2023-03-13 DIAGNOSIS — J32.9 SINUSITIS, UNSPECIFIED CHRONICITY, UNSPECIFIED LOCATION: ICD-10-CM

## 2023-03-13 DIAGNOSIS — Z20.822 CLOSE EXPOSURE TO COVID-19 VIRUS: Primary | ICD-10-CM

## 2023-03-13 DIAGNOSIS — R53.83 OTHER FATIGUE: ICD-10-CM

## 2023-03-13 DIAGNOSIS — J30.89 SEASONAL ALLERGIC RHINITIS DUE TO OTHER ALLERGIC TRIGGER: ICD-10-CM

## 2023-03-13 PROCEDURE — 87428 SARSCOV & INF VIR A&B AG IA: CPT | Performed by: FAMILY MEDICINE

## 2023-03-13 PROCEDURE — 1159F MED LIST DOCD IN RCRD: CPT | Performed by: FAMILY MEDICINE

## 2023-03-13 PROCEDURE — 99213 OFFICE O/P EST LOW 20 MIN: CPT | Performed by: FAMILY MEDICINE

## 2023-03-13 PROCEDURE — 1160F RVW MEDS BY RX/DR IN RCRD: CPT | Performed by: FAMILY MEDICINE

## 2023-04-03 NOTE — PROGRESS NOTES
[facility]""     VITALS: Heart rate 68, temperature 97.2, weight 168 pounds    Subjective  Chief Complaint  Nasal Congestion and Sinusitis    Subjective          History of Present Illness:  Patient is a 82 y.o.  male with medical conditions significant for allergic rhinitis and hypertension who presents to clinic secondary to an acute concern.  Patient is not feeling well today.  He has been exposed to COVID.  Patient complains of nasal congestion and sinusitis.  He denies any fevers or chills.  He denies any coughing.  No shortness of breath.    No complaints about any of the medications.    The following portions of the patient's history were reviewed and updated as appropriate: allergies, current medications, past family history, past medical history, past social history, past surgical history and problem list.    Past Medical History  Past Medical History:   Diagnosis Date   • Acid reflux    • Arthritis    • Environmental allergies    • Hypertension     STATES NO HIGH BLOOD PRESSURE. BUT XTRA VEIN IN HEART   • Kidney stones    • Macular degeneration        Surgical History  Past Surgical History:   Procedure Laterality Date   • ESOPHAGEAL DILATATION     • HERNIA REPAIR     • KNEE ARTHROSCOPY Right    • KNEE SURGERY     • TOTAL HIP ARTHROPLASTY Left 8/9/2016    Procedure: TOTAL HIP ARTHROPLASTY;  Surgeon: Yoel Menjivar MD;  Location: [facility];  Service:    • TOTAL HIP ARTHROPLASTY         Family History  Family History   Problem Relation Age of Onset   • Osteoporosis Sister    • Stroke Sister    • Heart disease Brother    • Other Mother         Arthritis, Gout, Pneumonia   • Stroke Father        Social History  Social History     Socioeconomic History   • Marital status:    Tobacco Use   • Smoking status: Never   • Smokeless tobacco: Current     Types: Chew   Vaping Use   • Vaping Use: Never used   Substance and Sexual Activity   • Alcohol use: No   • Drug use: No   • Sexual activity: Defer        Objective   Physical Exam     Gen: Patient in NAD. Pleasant and answers appropriately. A&Ox3.    Skin: Warm and dry with normal turgor. No purpura, rashes, or unusual pigmentation noted. Hair is normal in appearance and distribution.    HEENT: NC/AT. No lesions noted. Conjunctiva clear, sclera nonicteric. PERRL. EOMI without nystagmus or strabismus. Fundi appear benign. No hemorrhages or exudates of eyes. Auditory canals are patent bilaterally without lesions. TMs intact,  nonerythematous, nonbulging without lesions. Nasal mucosa erythematous, and nonedematous. Frontal and maxillary sinuses are nontender. O/P erythematous and moist without exudate.    Neck: Supple without lymph nodes palpated. FROM.     Lungs: CTA B/L without rales, rhonchi, crackles, or wheezes.    Heart: RRR. S1 and S2 normal. No S3 or S4. No MRGT.    Abd: Soft, nontender,nondistended. (+)BSx4 quadrants.     Extrem: No CCE. Radial pulses 2+/4 and equal B/L. FROMx4. No bone, joint, or muscle tenderness noted.    Neuro: No focal motor/sensory deficits.    Procedures    Result Review :   The following data was reviewed by: Yane Estrada MD on 03/13/2023:                Assessment and Plan    Dustin Jordan is a 82 y.o. here for an acute concern.    Problem List Items Addressed This Visit    None  Visit Diagnoses     Close exposure to COVID-19 virus    -  Primary    Sinusitis, unspecified chronicity, unspecified location        Relevant Orders    POCT SARS-CoV-2 Antigen DELMA + Flu (Completed)    Other fatigue        Relevant Orders    POCT SARS-CoV-2 Antigen DELMA + Flu (Completed)    Seasonal allergic rhinitis due to other allergic trigger      Encouraged saline rinse.    Exudative age-related macular degeneration, left eye, with inactive scar                BMI is within normal parameters. No other follow-up for BMI required.           Follow Up   Return in about 3 months (around 6/13/2023).  Findings and plans discussed with patient who  verbalizes understanding and agreement. Will followup with patient once results are in. Patient was given instructions and counseling regarding his condition or for health maintenance advice. Please see specific information pulled into the AVS if appropriate.       Yane Estrada MD

## 2023-07-22 DIAGNOSIS — Z91.09 ENVIRONMENTAL ALLERGIES: ICD-10-CM

## 2023-07-24 RX ORDER — CETIRIZINE HYDROCHLORIDE 10 MG/1
5 TABLET ORAL DAILY
Qty: 30 TABLET | Refills: 2 | Status: SHIPPED | OUTPATIENT
Start: 2023-07-24

## 2024-01-18 DIAGNOSIS — Q24.5: ICD-10-CM

## 2024-01-22 ENCOUNTER — TELEPHONE (OUTPATIENT)
Dept: FAMILY MEDICINE CLINIC | Facility: CLINIC | Age: 84
End: 2024-01-22
Payer: MEDICARE

## 2024-01-22 RX ORDER — METOPROLOL TARTRATE 50 MG/1
25 TABLET, FILM COATED ORAL DAILY
Qty: 90 TABLET | Refills: 1 | Status: SHIPPED | OUTPATIENT
Start: 2024-01-22

## 2024-01-22 NOTE — TELEPHONE ENCOUNTER
Spoke with wife BP was 166/81 HR-51 but had taken Mucinex earlier & she reports it does him that way,requested a FU for him & her & both were scheduled for 2/06/2024 but its in the evening they would both like to get labs (Routine) done a few days prior?

## 2024-01-23 ENCOUNTER — OFFICE VISIT (OUTPATIENT)
Dept: FAMILY MEDICINE CLINIC | Facility: CLINIC | Age: 84
End: 2024-01-23
Payer: MEDICARE

## 2024-01-23 VITALS
TEMPERATURE: 97.1 F | DIASTOLIC BLOOD PRESSURE: 78 MMHG | WEIGHT: 163.4 LBS | BODY MASS INDEX: 23.39 KG/M2 | SYSTOLIC BLOOD PRESSURE: 136 MMHG | HEIGHT: 70 IN | HEART RATE: 56 BPM | OXYGEN SATURATION: 96 %

## 2024-01-23 DIAGNOSIS — R05.9 COUGH, UNSPECIFIED TYPE: Primary | ICD-10-CM

## 2024-01-23 DIAGNOSIS — J06.9 UPPER RESPIRATORY TRACT INFECTION, UNSPECIFIED TYPE: ICD-10-CM

## 2024-01-23 DIAGNOSIS — Z12.5 ENCOUNTER FOR SCREENING FOR MALIGNANT NEOPLASM OF PROSTATE: ICD-10-CM

## 2024-01-23 DIAGNOSIS — Z13.220 ENCOUNTER FOR LIPID SCREENING FOR CARDIOVASCULAR DISEASE: ICD-10-CM

## 2024-01-23 DIAGNOSIS — L03.213 PRESEPTAL CELLULITIS OF LEFT EYE: ICD-10-CM

## 2024-01-23 DIAGNOSIS — H61.23 BILATERAL IMPACTED CERUMEN: ICD-10-CM

## 2024-01-23 DIAGNOSIS — H57.89 EYE IRRITATION: ICD-10-CM

## 2024-01-23 DIAGNOSIS — Z13.6 ENCOUNTER FOR LIPID SCREENING FOR CARDIOVASCULAR DISEASE: ICD-10-CM

## 2024-01-23 RX ORDER — OFLOXACIN 3 MG/ML
1 SOLUTION/ DROPS OPHTHALMIC 4 TIMES DAILY
Qty: 10 ML | Refills: 0 | Status: SHIPPED | OUTPATIENT
Start: 2024-01-23

## 2024-01-23 RX ORDER — AMOXICILLIN AND CLAVULANATE POTASSIUM 875; 125 MG/1; MG/1
1 TABLET, FILM COATED ORAL 2 TIMES DAILY
Qty: 14 TABLET | Refills: 0 | Status: SHIPPED | OUTPATIENT
Start: 2024-01-23 | End: 2024-02-06

## 2024-01-23 NOTE — PROGRESS NOTES
"[facility]     VITALS: Blood pressure 136/78, pulse 56, temperature 97.1 °F (36.2 °C), temperature source Temporal, height 177.8 cm (70\"), weight 74.1 kg (163 lb 6.4 oz), SpO2 96%.    Subjective  Chief Complaint  Cough and Eye irritation    Subjective          History of Present Illness:  The patient is a 83 y.o. male with medical conditions significant for hypertension and allergic rhinitis who presents to clinic for medical follow-up. He has had a cough and eye irritation. He is accompanied by an adult female who is his wife.    The patient has been worried about his eye. His eye is swollen now. He is having congestion and headache. He was tested for flu and COVID-19 and was negative for both of them. He was given eye drops. He has macular degeneration. His eye was a little sore when he moved his eyes around, but it is not hurting bad. He denies any fevers. He is having a runny nose. It is clear and lumpy. He took 2 doses of Mucinex in liquid form on 01/22/2024. He has never taken Tessalon Perles. He has been coughing, but he coughs pretty good when he does cough. He is getting some lung exercise.    The patient would like his ears cleaned.    The patient has shoulder problems.     The following portions of the patient's history were reviewed and updated as appropriate: allergies, current medications, past family history, past medical history, past social history, past surgical history and problem list.    Past Medical History  Past Medical History:   Diagnosis Date    Acid reflux     Arthritis     Environmental allergies     Hypertension     STATES NO HIGH BLOOD PRESSURE. BUT XTRA VEIN IN HEART    Kidney stones     Macular degeneration        Surgical History  Past Surgical History:   Procedure Laterality Date    ESOPHAGEAL DILATATION      HERNIA REPAIR      KNEE ARTHROSCOPY Right     KNEE SURGERY      TOTAL HIP ARTHROPLASTY Left 8/9/2016    Procedure: TOTAL HIP ARTHROPLASTY;  Surgeon: Yoel Menjivar, " "MD;  Location: [facility] OR;  Service:     TOTAL HIP ARTHROPLASTY         Family History  Family History   Problem Relation Age of Onset    Osteoporosis Sister     Stroke Sister     Heart disease Brother     Other Mother         Arthritis, Gout, Pneumonia    Stroke Father        Social History  Social History     Socioeconomic History    Marital status:    Tobacco Use    Smoking status: Never    Smokeless tobacco: Current     Types: Chew   Vaping Use    Vaping Use: Never used   Substance and Sexual Activity    Alcohol use: No    Drug use: No    Sexual activity: Defer       Objective   Vital Signs:   /78 (BP Location: Right arm, Patient Position: Sitting, Cuff Size: Adult)   Pulse 56   Temp 97.1 °F (36.2 °C) (Temporal)   Ht 177.8 cm (70\")   Wt 74.1 kg (163 lb 6.4 oz)   SpO2 96%   BMI 23.45 kg/m²     Physical Exam     Gen: Patient in NAD. Pleasant and answers appropriately. A&Ox3.    Skin: Warm and dry with normal turgor. No purpura, rashes, or unusual pigmentation noted. Hair is normal in appearance and distribution.    HEENT: NC/AT. No lesions noted. Conjunctiva injected, sclera nonicteric. PERRL. EOMI without nystagmus or strabismus. Fundi appear benign. No hemorrhages or exudates of eyes.  Left eye is edematous.  Auditory canals are patent bilaterally without lesions. TMs intact,  nonerythematous, bulging without lesions. Nasal mucosa erythematous, and nonedematous. Frontal and maxillary sinuses are nontender. O/P erythematous and moist without exudate.    Neck: Supple without lymph nodes palpated. FROM.     Lungs: Slightly decreased B/L without rales, rhonchi, crackles, or wheezes.    Heart: RRR. S1 and S2 normal. No S3 or S4. No MRGT.    Abd: Soft, nontender,nondistended. (+)BSx4 quadrants.     Extrem: No CCE. Radial pulses 2+/4 and equal B/L. FROMx4.  Positive joint tenderness noted.    Neuro: No focal motor/sensory deficits.      Ear Cerumen Removal    Date/Time: 1/23/2024 12:30 PM    Performed " "by: Yane Estrada MD  Authorized by: Yane Estrada MD  Consent: Verbal consent obtained. Written consent obtained.  Risks and benefits: risks, benefits and alternatives were discussed  Consent given by: patient  Patient understanding: patient states understanding of the procedure being performed  Patient consent: the patient's understanding of the procedure matches consent given  Procedure consent: procedure consent matches procedure scheduled  Relevant documents: relevant documents present and verified  Patient identity confirmed: verbally with patient  Time out: Immediately prior to procedure a \"time out\" was called to verify the correct patient, procedure, equipment, support staff and site/side marked as required.    Anesthesia:  Local Anesthetic: none  Location details: left ear and right ear  Patient tolerance: patient tolerated the procedure well with no immediate complications  Procedure type: irrigation   Sedation:  Patient sedated: no             Assessment and Plan      This is a 83-year-old male who presents to clinic for medical follow-up.    Diagnoses and all orders for this visit:    1. Cough, unspecified type (Primary)  Comments:  Prescribed Tessalon Perles.  Prescribed cough syrup.  Orders:  -     POCT SARS-CoV-2 Antigen DELMA + Flu  -     CBC Auto Differential; Future  -     Comprehensive Metabolic Panel; Future    2. Eye irritation  Comments:  Prescribed oral antibiotics.  Prescribed eye drops.  Orders:  -     POCT SARS-CoV-2 Antigen DELMA + Flu  -     CBC Auto Differential; Future  -     Comprehensive Metabolic Panel; Future    3. Encounter for lipid screening for cardiovascular disease  -     Lipid Panel; Future    4. Encounter for screening for malignant neoplasm of prostate  -     PSA Screen; Future    5. Preseptal cellulitis of left eye  -     amoxicillin-clavulanate (AUGMENTIN) 875-125 MG per tablet; Take 1 tablet by mouth 2 (Two) Times a Day. (Patient not taking: Reported on 2/6/2024)  " Dispense: 14 tablet; Refill: 0  -     ofloxacin (OCUFLOX) 0.3 % ophthalmic solution; Administer 1 drop into the left eye 4 (Four) Times a Day.  Dispense: 10 mL; Refill: 0    6. Upper respiratory tract infection, unspecified type  Supportive care indicated, including increased fluids and rest. Patient to monitor. Patient to call if symptoms continue or worsen.     Other orders  -     Ear Cerumen Removal        Problem List Items Addressed This Visit    None  Visit Diagnoses       Cough, unspecified type    -  Primary    Prescribed Tessalon Perles.  Prescribed cough syrup.    Relevant Orders    POCT SARS-CoV-2 Antigen DELMA + Flu (Completed)    CBC Auto Differential    Comprehensive Metabolic Panel    Eye irritation        Prescribed oral antibiotics.  Prescribed eye drops.    Relevant Medications    ofloxacin (OCUFLOX) 0.3 % ophthalmic solution    Other Relevant Orders    POCT SARS-CoV-2 Antigen DELMA + Flu (Completed)    CBC Auto Differential    Comprehensive Metabolic Panel    Encounter for lipid screening for cardiovascular disease        Relevant Orders    Lipid Panel    Encounter for screening for malignant neoplasm of prostate        Relevant Orders    PSA Screen    Preseptal cellulitis of left eye        Relevant Medications    amoxicillin-clavulanate (AUGMENTIN) 875-125 MG per tablet    ofloxacin (OCUFLOX) 0.3 % ophthalmic solution    Upper respiratory tract infection, unspecified type        Relevant Medications    amoxicillin-clavulanate (AUGMENTIN) 875-125 MG per tablet            BMI is within normal parameters. No other follow-up for BMI required.         Follow Up   Return (already has appt) will come back for labs.  Findings and plans discussed with patient who verbalizes understanding and agreement. Will followup with patient once results are in. Patient was given instructions and counseling regarding his condition or for health maintenance advice. Please see specific information pulled into the AVS if  appropriate.     Transcribed from ambient dictation for Yane Estrada MD by Selene Mays.  01/23/24   13:03 EST    Patient or patient representative verbalized consent to the visit recording.  I have personally performed the services described in this document as transcribed by the above individual, and it is both accurate and complete.

## 2024-02-01 ENCOUNTER — LAB (OUTPATIENT)
Dept: FAMILY MEDICINE CLINIC | Facility: CLINIC | Age: 84
End: 2024-02-01
Payer: MEDICARE

## 2024-02-01 DIAGNOSIS — Z12.5 ENCOUNTER FOR SCREENING FOR MALIGNANT NEOPLASM OF PROSTATE: ICD-10-CM

## 2024-02-01 DIAGNOSIS — H57.89 EYE IRRITATION: ICD-10-CM

## 2024-02-01 DIAGNOSIS — R05.9 COUGH, UNSPECIFIED TYPE: ICD-10-CM

## 2024-02-01 DIAGNOSIS — Z13.6 ENCOUNTER FOR LIPID SCREENING FOR CARDIOVASCULAR DISEASE: ICD-10-CM

## 2024-02-01 DIAGNOSIS — Z13.220 ENCOUNTER FOR LIPID SCREENING FOR CARDIOVASCULAR DISEASE: ICD-10-CM

## 2024-02-01 LAB
ALBUMIN SERPL-MCNC: 4.1 G/DL (ref 3.5–5.2)
ALBUMIN/GLOB SERPL: 2.1 G/DL
ALP SERPL-CCNC: 82 U/L (ref 39–117)
ALT SERPL W P-5'-P-CCNC: 19 U/L (ref 1–41)
ANION GAP SERPL CALCULATED.3IONS-SCNC: 12.2 MMOL/L (ref 5–15)
AST SERPL-CCNC: 21 U/L (ref 1–40)
BASOPHILS # BLD AUTO: 0.06 10*3/MM3 (ref 0–0.2)
BASOPHILS NFR BLD AUTO: 0.8 % (ref 0–1.5)
BILIRUB SERPL-MCNC: 0.5 MG/DL (ref 0–1.2)
BUN SERPL-MCNC: 10 MG/DL (ref 8–23)
BUN/CREAT SERPL: 13.5 (ref 7–25)
CALCIUM SPEC-SCNC: 8.7 MG/DL (ref 8.6–10.5)
CHLORIDE SERPL-SCNC: 98 MMOL/L (ref 98–107)
CHOLEST SERPL-MCNC: 174 MG/DL (ref 0–200)
CO2 SERPL-SCNC: 23.8 MMOL/L (ref 22–29)
CREAT SERPL-MCNC: 0.74 MG/DL (ref 0.76–1.27)
DEPRECATED RDW RBC AUTO: 40.3 FL (ref 37–54)
EGFRCR SERPLBLD CKD-EPI 2021: 90.5 ML/MIN/1.73
EOSINOPHIL # BLD AUTO: 0.28 10*3/MM3 (ref 0–0.4)
EOSINOPHIL NFR BLD AUTO: 3.8 % (ref 0.3–6.2)
ERYTHROCYTE [DISTWIDTH] IN BLOOD BY AUTOMATED COUNT: 12.2 % (ref 12.3–15.4)
GLOBULIN UR ELPH-MCNC: 2 GM/DL
GLUCOSE SERPL-MCNC: 87 MG/DL (ref 65–99)
HCT VFR BLD AUTO: 45.6 % (ref 37.5–51)
HDLC SERPL-MCNC: 48 MG/DL (ref 40–60)
HGB BLD-MCNC: 15.9 G/DL (ref 13–17.7)
IMM GRANULOCYTES # BLD AUTO: 0.29 10*3/MM3 (ref 0–0.05)
IMM GRANULOCYTES NFR BLD AUTO: 4 % (ref 0–0.5)
LDLC SERPL CALC-MCNC: 114 MG/DL (ref 0–100)
LDLC/HDLC SERPL: 2.37 {RATIO}
LYMPHOCYTES # BLD AUTO: 1.03 10*3/MM3 (ref 0.7–3.1)
LYMPHOCYTES NFR BLD AUTO: 14.1 % (ref 19.6–45.3)
MCH RBC QN AUTO: 31.5 PG (ref 26.6–33)
MCHC RBC AUTO-ENTMCNC: 34.9 G/DL (ref 31.5–35.7)
MCV RBC AUTO: 90.3 FL (ref 79–97)
MONOCYTES # BLD AUTO: 0.7 10*3/MM3 (ref 0.1–0.9)
MONOCYTES NFR BLD AUTO: 9.6 % (ref 5–12)
NEUTROPHILS NFR BLD AUTO: 4.94 10*3/MM3 (ref 1.7–7)
NEUTROPHILS NFR BLD AUTO: 67.7 % (ref 42.7–76)
NRBC BLD AUTO-RTO: 0 /100 WBC (ref 0–0.2)
PLATELET # BLD AUTO: 170 10*3/MM3 (ref 140–450)
PMV BLD AUTO: 8.6 FL (ref 6–12)
POTASSIUM SERPL-SCNC: 4.3 MMOL/L (ref 3.5–5.2)
PROT SERPL-MCNC: 6.1 G/DL (ref 6–8.5)
PSA SERPL-MCNC: 1.23 NG/ML (ref 0–4)
RBC # BLD AUTO: 5.05 10*6/MM3 (ref 4.14–5.8)
SODIUM SERPL-SCNC: 134 MMOL/L (ref 136–145)
TRIGL SERPL-MCNC: 62 MG/DL (ref 0–150)
VLDLC SERPL-MCNC: 12 MG/DL (ref 5–40)
WBC NRBC COR # BLD AUTO: 7.3 10*3/MM3 (ref 3.4–10.8)

## 2024-02-01 PROCEDURE — 80061 LIPID PANEL: CPT | Performed by: FAMILY MEDICINE

## 2024-02-01 PROCEDURE — 36415 COLL VENOUS BLD VENIPUNCTURE: CPT

## 2024-02-01 PROCEDURE — G0103 PSA SCREENING: HCPCS | Performed by: FAMILY MEDICINE

## 2024-02-01 PROCEDURE — 80053 COMPREHEN METABOLIC PANEL: CPT | Performed by: FAMILY MEDICINE

## 2024-02-01 PROCEDURE — 85025 COMPLETE CBC W/AUTO DIFF WBC: CPT | Performed by: FAMILY MEDICINE

## 2024-02-06 ENCOUNTER — OFFICE VISIT (OUTPATIENT)
Dept: FAMILY MEDICINE CLINIC | Facility: CLINIC | Age: 84
End: 2024-02-06
Payer: MEDICARE

## 2024-02-06 VITALS
DIASTOLIC BLOOD PRESSURE: 78 MMHG | BODY MASS INDEX: 23.31 KG/M2 | HEIGHT: 70 IN | WEIGHT: 162.8 LBS | HEART RATE: 60 BPM | SYSTOLIC BLOOD PRESSURE: 138 MMHG | TEMPERATURE: 98 F | OXYGEN SATURATION: 95 %

## 2024-02-06 DIAGNOSIS — I10 ESSENTIAL HYPERTENSION: Primary | ICD-10-CM

## 2024-02-06 DIAGNOSIS — J30.89 SEASONAL ALLERGIC RHINITIS DUE TO OTHER ALLERGIC TRIGGER: ICD-10-CM

## 2024-02-06 PROCEDURE — 1159F MED LIST DOCD IN RCRD: CPT | Performed by: FAMILY MEDICINE

## 2024-02-06 PROCEDURE — 1160F RVW MEDS BY RX/DR IN RCRD: CPT | Performed by: FAMILY MEDICINE

## 2024-02-06 PROCEDURE — 99214 OFFICE O/P EST MOD 30 MIN: CPT | Performed by: FAMILY MEDICINE

## 2024-02-06 NOTE — PROGRESS NOTES
"[facility]     VITALS: Blood pressure 138/78, pulse 60, temperature 98 °F (36.7 °C), height 177.8 cm (70\"), weight 73.8 kg (162 lb 12.8 oz), SpO2 95%.    Subjective  Chief Complaint  Hypertension (Discuss labs)    Subjective          History of Present Illness:  Patient is a 83 y.o. male with medical conditions significant for hypertension and allergic rhinitis who presents to clinic for medical follow-up.    He has completed the course of his antibiotic and has noted improvement with topical ophthalmic solution. After he completed ear lavage for cerumen impaction, he noticed improvement.    Reports nocturnal urinary frequency.    No complaints about any of the medications.    The following portions of the patient's history were reviewed and updated as appropriate: allergies, current medications, past family history, past medical history, past social history, past surgical history and problem list.    Past Medical History  Past Medical History:   Diagnosis Date    Acid reflux     Arthritis     Environmental allergies     Hypertension     STATES NO HIGH BLOOD PRESSURE. BUT XTRA VEIN IN HEART    Kidney stones     Macular degeneration        Surgical History  Past Surgical History:   Procedure Laterality Date    ESOPHAGEAL DILATATION      HERNIA REPAIR      KNEE ARTHROSCOPY Right     KNEE SURGERY      TOTAL HIP ARTHROPLASTY Left 8/9/2016    Procedure: TOTAL HIP ARTHROPLASTY;  Surgeon: Yoel Menjivar MD;  Location: [facility];  Service:     TOTAL HIP ARTHROPLASTY         Family History  Family History   Problem Relation Age of Onset    Osteoporosis Sister     Stroke Sister     Heart disease Brother     Other Mother         Arthritis, Gout, Pneumonia    Stroke Father        Social History  Social History     Socioeconomic History    Marital status:    Tobacco Use    Smoking status: Never    Smokeless tobacco: Current     Types: Chew   Vaping Use    Vaping Use: Never used   Substance and Sexual Activity    " "Alcohol use: No    Drug use: No    Sexual activity: Defer       Objective   Vital Signs:   /78 (BP Location: Right arm, Patient Position: Sitting)   Pulse 60   Temp 98 °F (36.7 °C)   Ht 177.8 cm (70\")   Wt 73.8 kg (162 lb 12.8 oz)   SpO2 95%   BMI 23.36 kg/m²     Physical Exam     Gen: Patient in NAD. Pleasant and answers appropriately. A&Ox3.    Skin: Warm and dry with normal turgor. No purpura, rashes, or unusual pigmentation noted. Hair is normal in appearance and distribution.    HEENT: NC/AT. No lesions noted. Conjunctiva clear, sclera nonicteric. PERRL. EOMI without nystagmus or strabismus. Fundi appear benign. No hemorrhages or exudates of eyes. Auditory canals are patent bilaterally without lesions. TMs intact,  nonerythematous, nonbulging without lesions. Nasal mucosa pink, nonerythematous, and nonedematous. Frontal and maxillary sinuses are nontender. O/P nonerythematous and moist without exudate.    Neck: Supple without lymph nodes palpated. FROM.     Lungs: CTA B/L without rales, rhonchi, crackles, or wheezes.    Heart: RRR. S1 and S2 normal. No S3 or S4. No MRGT.    Abd: Soft, nontender,nondistended. (+)BSx4 quadrants.     Extrem: No CCE. Radial pulses 2+/4 and equal B/L. FROMx4. No bone, joint, or muscle tenderness noted.    Neuro: No focal motor/sensory deficits.    Procedures    Result Review :   The following data was reviewed by: Yane Estrada MD on 02/06/2024:         PSA Screen (02/01/2024 10:52)    CBC Auto Differential (02/01/2024 10:52)    Comprehensive Metabolic Panel (02/01/2024 10:52)    Lipid Panel (02/01/2024 10:52)     Assessment and Plan    This is a 83-year-old male presents to clinic for medical follow-up.    Diagnoses and all orders for this visit:    1. Essential hypertension (Primary)  Slightly elevated today.  Patient to monitor.  Patient on metoprolol 25 mg orally twice a day.    2. Seasonal allergic rhinitis due to other allergic trigger  Encouraged nasal " rinse.    3.  Immunization due  Recommended to complete updated pneumonia vaccine.    Problem List Items Addressed This Visit    None      BMI is within normal parameters. No other follow-up for BMI required.         I spent 32 minutes caring for Dustin on this date of service. This time includes time spent by me in the following activities:reviewing tests, obtaining and/or reviewing a separately obtained history, performing a medically appropriate examination and/or evaluation , and counseling and educating the patient/family/caregiver  Follow Up   Return in about 6 months (around 8/6/2024).  Findings and plans discussed with patient who verbalizes understanding and agreement. Will followup with patient once results are in. Patient was given instructions and counseling regarding his condition or for health maintenance advice. Please see specific information pulled into the AVS if appropriate.       Yane Estrada MD    Transcribed from ambient dictation for Yane Estrada MD by Janis Chavez.  02/06/24   16:18 EST    Patient or patient representative verbalized consent to the visit recording.  I have personally performed the services described in this document as transcribed by the above individual, and it is both accurate and complete.

## 2024-04-02 ENCOUNTER — OFFICE VISIT (OUTPATIENT)
Dept: FAMILY MEDICINE CLINIC | Facility: CLINIC | Age: 84
End: 2024-04-02
Payer: MEDICARE

## 2024-04-02 VITALS
SYSTOLIC BLOOD PRESSURE: 152 MMHG | OXYGEN SATURATION: 96 % | TEMPERATURE: 97.7 F | WEIGHT: 161.8 LBS | DIASTOLIC BLOOD PRESSURE: 86 MMHG | HEIGHT: 70 IN | HEART RATE: 95 BPM | BODY MASS INDEX: 23.16 KG/M2

## 2024-04-02 DIAGNOSIS — U07.1 COVID-19: ICD-10-CM

## 2024-04-02 DIAGNOSIS — R50.9 FEVER, UNSPECIFIED FEVER CAUSE: Primary | ICD-10-CM

## 2024-04-02 DIAGNOSIS — R52 BODY ACHES: ICD-10-CM

## 2024-04-02 LAB
EXPIRATION DATE: ABNORMAL
EXPIRATION DATE: NORMAL
FLUAV AG NPH QL: NEGATIVE
FLUBV AG NPH QL: NEGATIVE
INTERNAL CONTROL: ABNORMAL
INTERNAL CONTROL: NORMAL
Lab: ABNORMAL
Lab: NORMAL
SARS-COV-2 AG UPPER RESP QL IA.RAPID: DETECTED

## 2024-04-02 RX ORDER — ONDANSETRON 4 MG/1
4 TABLET, ORALLY DISINTEGRATING ORAL EVERY 8 HOURS PRN
Qty: 30 TABLET | Refills: 0 | Status: SHIPPED | OUTPATIENT
Start: 2024-04-02

## 2024-04-02 NOTE — PROGRESS NOTES
"Chief Complaint  Generalized Body Aches and Fever    [facility] presents to [facility]  Fever   This is a new problem. The current episode started yesterday. Associated symptoms include congestion and coughing. He has tried fluids for the symptoms. The treatment provided mild relief.       Review of Systems   Constitutional:  Positive for fever.   HENT:  Positive for congestion.    Respiratory:  Positive for cough.          Objective   Vital Signs:   /86 (BP Location: Right arm, Patient Position: Sitting, Cuff Size: Adult)   Pulse 95   Temp 97.7 °F (36.5 °C) (Temporal)   Ht 177.8 cm (70\")   Wt 73.4 kg (161 lb 12.8 oz)   SpO2 96%   BMI 23.22 kg/m²     Physical Exam  Constitutional:       General: He is not in acute distress.     Appearance: Normal appearance. He is well-developed and well-groomed. He is not ill-appearing, toxic-appearing or diaphoretic.   HENT:      Head: Normocephalic.      Nose: Nose normal. No congestion or rhinorrhea.      Mouth/Throat:      Mouth: Mucous membranes are moist.      Pharynx: Oropharynx is clear. No oropharyngeal exudate or posterior oropharyngeal erythema.   Eyes:      General: Lids are normal.         Right eye: No discharge.         Left eye: No discharge.      Extraocular Movements: Extraocular movements intact.      Pupils: Pupils are equal, round, and reactive to light.   Neck:      Vascular: No carotid bruit.   Cardiovascular:      Rate and Rhythm: Normal rate and regular rhythm.      Pulses: Normal pulses.      Heart sounds: No murmur heard.     No friction rub. No gallop.   Pulmonary:      Effort: Pulmonary effort is normal. No respiratory distress.      Breath sounds: No stridor. No wheezing, rhonchi or rales.   Chest:      Chest wall: No tenderness.   Abdominal:      General: There is no distension.      Palpations: There is no mass.      Tenderness: There is no abdominal tenderness. There is no right CVA " tenderness, left CVA tenderness, guarding or rebound.      Hernia: No hernia is present.   Musculoskeletal:         General: No swelling or tenderness. Normal range of motion.      Cervical back: Normal range of motion and neck supple. No rigidity or tenderness.      Right lower leg: No edema.      Left lower leg: No edema.   Lymphadenopathy:      Cervical: No cervical adenopathy.   Skin:     General: Skin is warm.      Capillary Refill: Capillary refill takes less than 2 seconds.      Coloration: Skin is not jaundiced.      Findings: No bruising, erythema or rash.   Neurological:      General: No focal deficit present.      Mental Status: He is alert and oriented to person, place, and time.      Motor: Motor function is intact. No weakness.      Coordination: Coordination is intact.      Gait: Gait is intact. Gait normal.   Psychiatric:         Attention and Perception: Attention normal.         Mood and Affect: Mood normal.         Speech: Speech normal.         Behavior: Behavior normal.         Cognition and Memory: Cognition normal.         Judgment: Judgment normal.        Result Review :                 Assessment and Plan    Diagnoses and all orders for this visit:    1. Fever, unspecified fever cause (Primary)  -     POCT SARS-CoV-2 Antigen DELMA  -     POCT Influenza A/B    2. Body aches  -     POCT SARS-CoV-2 Antigen DELMA  -     POCT Influenza A/B    3. COVID-19  -     Nirmatrelvir & Ritonavir, 300mg/100mg, (PAXLOVID); Take 3 tablets by mouth 2 (Two) Times a Day for 5 days.  Dispense: 30 tablet; Refill: 0    Other orders  -     ondansetron ODT (ZOFRAN-ODT) 4 MG disintegrating tablet; Place 1 tablet on the tongue Every 8 (Eight) Hours As Needed for Nausea or Vomiting.  Dispense: 30 tablet; Refill: 0      Patient's Body mass index is 23.22 kg/m². indicating that he is within normal range (BMI 18.5-24.9). No BMI management plan needed..    Follow Up   Return if symptoms worsen or fail to improve.  Patient was  given instructions and counseling regarding his condition or for health maintenance advice. Please see specific information pulled into the AVS if appropriate.     This document has been electronically signed by MARY JO Church  April 2, 2024 13:14 EDT

## 2024-04-08 ENCOUNTER — TELEPHONE (OUTPATIENT)
Dept: FAMILY MEDICINE CLINIC | Facility: CLINIC | Age: 84
End: 2024-04-08
Payer: MEDICARE

## 2024-04-08 NOTE — TELEPHONE ENCOUNTER
I contacted the pt for clarification on stomach issues, he states that he has been feeling very nauseated on the paxlovid. He was given Zofran to try to relieve the symptoms. He is now finished with paxlovid. He states that he has been drinking more water to try to get it flushed out. He is just concerned on whether he should start taking his allergy medications now.

## 2024-04-08 NOTE — TELEPHONE ENCOUNTER
Caller: Jadyn Jordan     Relationship: [employer]     Best call back number: [phone]     What is your medical concern? PATIENT COMPLETED PAXLOVID PRESCRIPTION AND THIS HAS GIVEN HIM A LOT OF STOMACH ISSUES.    THEY ARE REQUESTING DIRECTION FROM DR BARBA ON WHETHER OR NOT HE CAN START TAKING THE ALLERGY MEDICATIONS AND NASAL SPRAY NOW THAT HE FINISHED THE PRESCRIPTION.    PLEASE ADVISE

## 2024-04-19 ENCOUNTER — OFFICE VISIT (OUTPATIENT)
Dept: FAMILY MEDICINE CLINIC | Facility: CLINIC | Age: 84
End: 2024-04-19
Payer: MEDICARE

## 2024-04-19 VITALS
SYSTOLIC BLOOD PRESSURE: 140 MMHG | TEMPERATURE: 91.4 F | DIASTOLIC BLOOD PRESSURE: 68 MMHG | BODY MASS INDEX: 22.79 KG/M2 | HEIGHT: 70 IN | HEART RATE: 52 BPM | WEIGHT: 159.2 LBS | OXYGEN SATURATION: 97 %

## 2024-04-19 DIAGNOSIS — N40.1 BENIGN PROSTATIC HYPERPLASIA WITH URINARY HESITANCY: Primary | ICD-10-CM

## 2024-04-19 DIAGNOSIS — R31.1 BENIGN ESSENTIAL MICROSCOPIC HEMATURIA: ICD-10-CM

## 2024-04-19 DIAGNOSIS — R39.11 BENIGN PROSTATIC HYPERPLASIA WITH URINARY HESITANCY: Primary | ICD-10-CM

## 2024-04-19 LAB
BILIRUB BLD-MCNC: NEGATIVE MG/DL
CLARITY, POC: ABNORMAL
COLOR UR: YELLOW
EXPIRATION DATE: ABNORMAL
GLUCOSE UR STRIP-MCNC: NEGATIVE MG/DL
KETONES UR QL: NEGATIVE
LEUKOCYTE EST, POC: NEGATIVE
Lab: ABNORMAL
NITRITE UR-MCNC: NEGATIVE MG/ML
PH UR: 6 [PH] (ref 5–8)
PROT UR STRIP-MCNC: NEGATIVE MG/DL
RBC # UR STRIP: ABNORMAL /UL
SP GR UR: 1.01 (ref 1–1.03)
UROBILINOGEN UR QL: NORMAL

## 2024-04-19 PROCEDURE — 99214 OFFICE O/P EST MOD 30 MIN: CPT | Performed by: STUDENT IN AN ORGANIZED HEALTH CARE EDUCATION/TRAINING PROGRAM

## 2024-04-19 PROCEDURE — 81003 URINALYSIS AUTO W/O SCOPE: CPT | Performed by: STUDENT IN AN ORGANIZED HEALTH CARE EDUCATION/TRAINING PROGRAM

## 2024-04-19 RX ORDER — TAMSULOSIN HYDROCHLORIDE 0.4 MG/1
1 CAPSULE ORAL DAILY
Qty: 30 CAPSULE | Refills: 2 | Status: SHIPPED | OUTPATIENT
Start: 2024-04-19

## 2024-04-19 NOTE — PROGRESS NOTES
Chief Complaint  GI Problem    HPI:   GI Problem       Dustin Jordan is a 83 y.o. male who presents today to [facility] for GI Problem. Patient was diagnosed with COVID-19 on 4/2. He was treated with paxlovid. He reports severe nausea and GI upset with the medication. He was given zofran for nausea which has not been beneficial. He reports that he has nausea, slow urine stream and feelings that he cannot completely empty his bladder. He points to his low mid abdomen of where the pain is.     Previous History:   Past Medical History:   Diagnosis Date    Acid reflux     Arthritis     Environmental allergies     Hypertension     STATES NO HIGH BLOOD PRESSURE. BUT XTRA VEIN IN HEART    Kidney stones     Macular degeneration       Past Surgical History:   Procedure Laterality Date    ESOPHAGEAL DILATATION      HERNIA REPAIR      KNEE ARTHROSCOPY Right     KNEE SURGERY      TOTAL HIP ARTHROPLASTY Left 8/9/2016    Procedure: TOTAL HIP ARTHROPLASTY;  Surgeon: Yoel Menjivar MD;  Location: [facility];  Service:     TOTAL HIP ARTHROPLASTY        Social History     Socioeconomic History    Marital status:    Tobacco Use    Smoking status: Never    Smokeless tobacco: Current     Types: Chew   Vaping Use    Vaping status: Never Used   Substance and Sexual Activity    Alcohol use: No    Drug use: No    Sexual activity: Defer      Health Maintenance Due   Topic Date Due    TDAP/TD VACCINES (1 - Tdap) Never done    ZOSTER VACCINE (1 of 2) Never done    COVID-19 Vaccine (3 - 2023-24 season) 09/01/2023    ANNUAL WELLNESS VISIT  06/13/2024        Current Medications:  Current Outpatient Medications   Medication Sig Dispense Refill    aspirin 81 MG chewable tablet Chew 2 tablets Daily.      cetirizine (zyrTEC) 10 MG tablet Take 0.5 tablets by mouth Daily. 30 tablet 2    Cholecalciferol (Vitamin D3) 50 MCG (2000 UT) capsule Take 1 capsule by mouth Daily. 30 capsule 11    Krill Oil 500 MG  "capsule Take 1 capsule by mouth Daily. 90 capsule 11    metoprolol tartrate (LOPRESSOR) 50 MG tablet TAKE 1/2 TABLET BY MOUTH TWICE DAILY 90 tablet 1    Multiple Vitamins-Minerals (VITEYES AREDS FORMULA/LUTEIN) capsule Take 1 capsule by mouth daily.      ondansetron ODT (ZOFRAN-ODT) 4 MG disintegrating tablet Place 1 tablet on the tongue Every 8 (Eight) Hours As Needed for Nausea or Vomiting. 30 tablet 0    ofloxacin (OCUFLOX) 0.3 % ophthalmic solution Administer 1 drop into the left eye 4 (Four) Times a Day. (Patient not taking: Reported on 4/19/2024) 10 mL 0    tamsulosin (FLOMAX) 0.4 MG capsule 24 hr capsule Take 1 capsule by mouth Daily. 30 capsule 2     No current facility-administered medications for this visit.       Allergies:   No Known Allergies    Vitals:   /68 (BP Location: Right arm, Patient Position: Sitting, Cuff Size: Adult)   Pulse 52   Temp (!) 91.4 °F (33 °C) (Temporal)   Ht 177.8 cm (70\")   Wt 72.2 kg (159 lb 3.2 oz)   SpO2 97%   BMI 22.84 kg/m²     Estimated body mass index is 22.84 kg/m² as calculated from the following:    Height as of this encounter: 177.8 cm (70\").    Weight as of this encounter: 72.2 kg (159 lb 3.2 oz).    [facility]. His smokeless tobacco use includes chew.           Physical Exam:   Physical Exam  Constitutional:       Appearance: Normal appearance.   HENT:      Mouth/Throat:      Mouth: Mucous membranes are moist.   Cardiovascular:      Rate and Rhythm: Normal rate and regular rhythm.   Pulmonary:      Effort: Pulmonary effort is normal.      Breath sounds: Normal breath sounds. No wheezing or rhonchi.   Abdominal:      General: Abdomen is flat. There is no distension.      Palpations: Abdomen is soft.      Tenderness: There is abdominal tenderness (Tenderness low mid abdomen below the umbilicus). There is no right CVA tenderness, left CVA tenderness, guarding or rebound.   Musculoskeletal:         General: Normal range of " motion.   Skin:     General: Skin is warm and dry.   Neurological:      Mental Status: He is alert.   Psychiatric:         Mood and Affect: Mood normal.          Lab Results:   Office Visit on 04/19/2024   Component Date Value Ref Range Status    Color 04/19/2024 Yellow  Yellow, Straw, Dark Yellow, Lizbeth Final    Clarity, UA 04/19/2024 Slightly Cloudy (A)  Clear Final    Specific Gravity  04/19/2024 1.015  1.005 - 1.030 Final    pH, Urine 04/19/2024 6.0  5.0 - 8.0 Final    Leukocytes 04/19/2024 Negative  Negative Final    Nitrite, UA 04/19/2024 Negative  Negative Final    Protein, POC 04/19/2024 Negative  Negative mg/dL Final    Glucose, UA 04/19/2024 Negative  Negative mg/dL Final    Ketones, UA 04/19/2024 Negative  Negative Final    Urobilinogen, UA 04/19/2024 Normal  Normal, 0.2 E.U./dL Final    Bilirubin 04/19/2024 Negative  Negative Final    Blood, UA 04/19/2024 Trace (A)  Negative Final    Lot Number 04/19/2024 [id number]   Final    Expiration Date 04/19/2024 1,142,025   Final   Office Visit on 04/02/2024   Component Date Value Ref Range Status    SARS Antigen 04/02/2024 Detected (A)  Not Detected, Presumptive Negative Final    Internal Control 04/02/2024 Passed  Passed Final    Lot Number 04/02/2024 [id number]   Final    Expiration Date 04/02/2024 2024-7-11   Final    Rapid Influenza A Ag 04/02/2024 Negative  Negative Final    Rapid Influenza B Ag 04/02/2024 Negative  Negative Final    Internal Control 04/02/2024 Passed  Passed Final    Lot Number 04/02/2024 [id number]   Final    Expiration Date 04/02/2024 2027-07-31   Final   Lab on 02/01/2024   Component Date Value Ref Range Status    WBC 02/01/2024 7.30  3.40 - 10.80 10*3/mm3 Final    RBC 02/01/2024 5.05  4.14 - 5.80 10*6/mm3 Final    Hemoglobin 02/01/2024 15.9  13.0 - 17.7 g/dL Final    Hematocrit 02/01/2024 45.6  37.5 - 51.0 % Final    MCV 02/01/2024 90.3  79.0 - 97.0 fL Final    MCH 02/01/2024 31.5  26.6 - 33.0 pg Final    MCHC 02/01/2024 34.9  31.5 -  35.7 g/dL Final    RDW 02/01/2024 12.2 (L)  12.3 - 15.4 % Final    RDW-SD 02/01/2024 40.3  37.0 - 54.0 fl Final    MPV 02/01/2024 8.6  6.0 - 12.0 fL Final    Platelets 02/01/2024 170  140 - 450 10*3/mm3 Final    Neutrophil % 02/01/2024 67.7  42.7 - 76.0 % Final    Lymphocyte % 02/01/2024 14.1 (L)  19.6 - 45.3 % Final    Monocyte % 02/01/2024 9.6  5.0 - 12.0 % Final    Eosinophil % 02/01/2024 3.8  0.3 - 6.2 % Final    Basophil % 02/01/2024 0.8  0.0 - 1.5 % Final    Immature Grans % 02/01/2024 4.0 (H)  0.0 - 0.5 % Final    Neutrophils, Absolute 02/01/2024 4.94  1.70 - 7.00 10*3/mm3 Final    Lymphocytes, Absolute 02/01/2024 1.03  0.70 - 3.10 10*3/mm3 Final    Monocytes, Absolute 02/01/2024 0.70  0.10 - 0.90 10*3/mm3 Final    Eosinophils, Absolute 02/01/2024 0.28  0.00 - 0.40 10*3/mm3 Final    Basophils, Absolute 02/01/2024 0.06  0.00 - 0.20 10*3/mm3 Final    Immature Grans, Absolute 02/01/2024 0.29 (H)  0.00 - 0.05 10*3/mm3 Final    nRBC 02/01/2024 0.0  0.0 - 0.2 /100 WBC Final    Glucose 02/01/2024 87  65 - 99 mg/dL Final    BUN 02/01/2024 10  8 - 23 mg/dL Final    Creatinine 02/01/2024 0.74 (L)  0.76 - 1.27 mg/dL Final    Sodium 02/01/2024 134 (L)  136 - 145 mmol/L Final    Potassium 02/01/2024 4.3  3.5 - 5.2 mmol/L Final    Chloride 02/01/2024 98  98 - 107 mmol/L Final    CO2 02/01/2024 23.8  22.0 - 29.0 mmol/L Final    Calcium 02/01/2024 8.7  8.6 - 10.5 mg/dL Final    Total Protein 02/01/2024 6.1  6.0 - 8.5 g/dL Final    Albumin 02/01/2024 4.1  3.5 - 5.2 g/dL Final    ALT (SGPT) 02/01/2024 19  1 - 41 U/L Final    AST (SGOT) 02/01/2024 21  1 - 40 U/L Final    Alkaline Phosphatase 02/01/2024 82  39 - 117 U/L Final    Total Bilirubin 02/01/2024 0.5  0.0 - 1.2 mg/dL Final    Globulin 02/01/2024 2.0  gm/dL Final    A/G Ratio 02/01/2024 2.1  g/dL Final    BUN/Creatinine Ratio 02/01/2024 13.5  7.0 - 25.0 Final    Anion Gap 02/01/2024 12.2  5.0 - 15.0 mmol/L Final    eGFR 02/01/2024 90.5  >60.0 mL/min/1.73 Final    Total  Cholesterol 02/01/2024 174  0 - 200 mg/dL Final    Triglycerides 02/01/2024 62  0 - 150 mg/dL Final    HDL Cholesterol 02/01/2024 48  40 - 60 mg/dL Final    LDL Cholesterol  02/01/2024 114 (H)  0 - 100 mg/dL Final    VLDL Cholesterol 02/01/2024 12  5 - 40 mg/dL Final    LDL/HDL Ratio 02/01/2024 2.37   Final    PSA 02/01/2024 1.230  0.000 - 4.000 ng/mL Final   Office Visit on 01/23/2024   Component Date Value Ref Range Status    SARS Antigen 01/23/2024 Not Detected  Not Detected, Presumptive Negative Final    Influenza A Antigen DELMA 01/23/2024 Not Detected  Not Detected Final    Influenza B Antigen DELMA 01/23/2024 Not Detected  Not Detected Final    Internal Control 01/23/2024 Passed  Passed Final    Lot Number 01/23/2024 [id number]   Final    Expiration Date 01/23/2024 2025-01-17   Final       Assessment and Plan  Diagnoses and all orders for this visit:    1. Benign prostatic hyperplasia with urinary hesitancy (Primary)  -     tamsulosin (FLOMAX) 0.4 MG capsule 24 hr capsule; Take 1 capsule by mouth Daily.  Dispense: 30 capsule; Refill: 2    2. Dysuria  -     POCT urinalysis dipstick, automated    Nausea is likely medication side effect well known of paxlovid. Which does seem to be improving. He has tenderness of his bladder. UA does have some hematuria but is otherwise normal. He has no flank pain, and no severe pain that would be consistent with urolithiasis. His symptoms are most consistent with BPH. I will try flomax. Recommend follow up next week if not improving would consider CT abd/plv. The patient understands and agrees to this plan.  Microscopic hematuria in a non-smoker. Would repeat at next visit. If persistent would consider cystoscopy.     BMI is within normal parameters. No other follow-up for BMI required.       New Medications:   New Medications Ordered This Visit   Medications    tamsulosin (FLOMAX) 0.4 MG capsule 24 hr capsule     Sig: Take 1 capsule by mouth Daily.     Dispense:  30 capsule      Refill:  2       Discontinued Medications:   There are no discontinued medications.              Follow Up:   No follow-ups on file.    Patient was given instructions and counseling regarding his condition or for health maintenance advice. Please see specific information pulled into the AVS if appropriate.       This document has been electronically signed by Diogo Faust DO   April 19, 2024 16:28 EDT    Dictated Utilizing Dragon Dictation: Part of this note may be an electronic transcription/translation of spoken language to printed text using the Dragon Dictation System.

## 2024-05-28 ENCOUNTER — OFFICE VISIT (OUTPATIENT)
Dept: FAMILY MEDICINE CLINIC | Facility: CLINIC | Age: 84
End: 2024-05-28
Payer: MEDICARE

## 2024-05-28 VITALS
SYSTOLIC BLOOD PRESSURE: 138 MMHG | HEART RATE: 54 BPM | BODY MASS INDEX: 22.76 KG/M2 | TEMPERATURE: 97.7 F | DIASTOLIC BLOOD PRESSURE: 74 MMHG | WEIGHT: 159 LBS | OXYGEN SATURATION: 95 % | HEIGHT: 70 IN

## 2024-05-28 DIAGNOSIS — M25.561 CHRONIC PAIN OF RIGHT KNEE: ICD-10-CM

## 2024-05-28 DIAGNOSIS — G89.29 CHRONIC PAIN OF RIGHT KNEE: ICD-10-CM

## 2024-05-28 DIAGNOSIS — N40.1 BENIGN PROSTATIC HYPERPLASIA WITH WEAK URINARY STREAM: ICD-10-CM

## 2024-05-28 DIAGNOSIS — R60.0 LOWER EXTREMITY EDEMA: Primary | ICD-10-CM

## 2024-05-28 DIAGNOSIS — R39.12 BENIGN PROSTATIC HYPERPLASIA WITH WEAK URINARY STREAM: ICD-10-CM

## 2024-05-28 PROCEDURE — 1125F AMNT PAIN NOTED PAIN PRSNT: CPT | Performed by: STUDENT IN AN ORGANIZED HEALTH CARE EDUCATION/TRAINING PROGRAM

## 2024-05-28 PROCEDURE — 99214 OFFICE O/P EST MOD 30 MIN: CPT | Performed by: STUDENT IN AN ORGANIZED HEALTH CARE EDUCATION/TRAINING PROGRAM

## 2024-05-28 PROCEDURE — 20610 DRAIN/INJ JOINT/BURSA W/O US: CPT | Performed by: STUDENT IN AN ORGANIZED HEALTH CARE EDUCATION/TRAINING PROGRAM

## 2024-05-28 RX ORDER — LIDOCAINE HYDROCHLORIDE 10 MG/ML
4 INJECTION, SOLUTION INFILTRATION; PERINEURAL
Status: COMPLETED | OUTPATIENT
Start: 2024-05-28 | End: 2024-05-28

## 2024-05-28 RX ORDER — TADALAFIL 5 MG/1
5 TABLET ORAL DAILY PRN
Qty: 30 TABLET | Refills: 2 | Status: SHIPPED | OUTPATIENT
Start: 2024-05-28

## 2024-05-28 RX ORDER — FUROSEMIDE 20 MG/1
20 TABLET ORAL DAILY
Qty: 5 TABLET | Refills: 0 | Status: SHIPPED | OUTPATIENT
Start: 2024-05-28

## 2024-05-28 RX ORDER — TRIAMCINOLONE ACETONIDE 40 MG/ML
40 INJECTION, SUSPENSION INTRA-ARTICULAR; INTRAMUSCULAR
Status: COMPLETED | OUTPATIENT
Start: 2024-05-28 | End: 2024-05-28

## 2024-05-28 RX ADMIN — LIDOCAINE HYDROCHLORIDE 4 ML: 10 INJECTION, SOLUTION INFILTRATION; PERINEURAL at 16:05

## 2024-05-28 RX ADMIN — TRIAMCINOLONE ACETONIDE 40 MG: 40 INJECTION, SUSPENSION INTRA-ARTICULAR; INTRAMUSCULAR at 16:05

## 2024-05-28 NOTE — PROGRESS NOTES
Chief Complaint  Knee Pain    HPI:   Knee Pain        Dustin Jordan is a 83 y.o. male who presents today to [facility] for Knee Pain. Patient states he has had right lower extremity swelling. He had this in the past in 2022, and had a Bakers cyst noted. DVT was ruled out. He was treated with lasix 20mg daily for five days and this resolved. He has chronic right knee pain with known OA. He has not had an injection before.     Previous History:   Past Medical History:   Diagnosis Date    Acid reflux     Arthritis     Environmental allergies     Hypertension     STATES NO HIGH BLOOD PRESSURE. BUT XTRA VEIN IN HEART    Kidney stones     Macular degeneration       Past Surgical History:   Procedure Laterality Date    ESOPHAGEAL DILATATION      HERNIA REPAIR      KNEE ARTHROSCOPY Right     KNEE SURGERY      TOTAL HIP ARTHROPLASTY Left 8/9/2016    Procedure: TOTAL HIP ARTHROPLASTY;  Surgeon: Yoel Menjivar MD;  Location: [facility];  Service:     TOTAL HIP ARTHROPLASTY        Social History     Socioeconomic History    Marital status:    Tobacco Use    Smoking status: Never    Smokeless tobacco: Current     Types: Chew   Vaping Use    Vaping status: Never Used   Substance and Sexual Activity    Alcohol use: No    Drug use: No    Sexual activity: Defer      Health Maintenance Due   Topic Date Due    TDAP/TD VACCINES (1 - Tdap) Never done    ZOSTER VACCINE (1 of 2) Never done    COVID-19 Vaccine (3 - 2023-24 season) 09/01/2023    ANNUAL WELLNESS VISIT  06/13/2024        Current Medications:  Current Outpatient Medications   Medication Sig Dispense Refill    aspirin 81 MG chewable tablet Chew 2 tablets Daily.      cetirizine (zyrTEC) 10 MG tablet Take 0.5 tablets by mouth Daily. 30 tablet 2    Cholecalciferol (Vitamin D3) 50 MCG (2000 UT) capsule Take 1 capsule by mouth Daily. 30 capsule 11    Krill Oil 500 MG capsule Take 1 capsule by mouth Daily. 90 capsule 11    metoprolol  "tartrate (LOPRESSOR) 50 MG tablet TAKE 1/2 TABLET BY MOUTH TWICE DAILY 90 tablet 1    Multiple Vitamins-Minerals (VITEYES AREDS FORMULA/LUTEIN) capsule Take 1 capsule by mouth daily.      ondansetron ODT (ZOFRAN-ODT) 4 MG disintegrating tablet Place 1 tablet on the tongue Every 8 (Eight) Hours As Needed for Nausea or Vomiting. 30 tablet 0    furosemide (Lasix) 20 MG tablet Take 1 tablet by mouth Daily. 5 tablet 0    ofloxacin (OCUFLOX) 0.3 % ophthalmic solution Administer 1 drop into the left eye 4 (Four) Times a Day. (Patient not taking: Reported on 4/19/2024) 10 mL 0    tadalafil (Cialis) 5 MG tablet Take 1 tablet by mouth Daily As Needed for Erectile Dysfunction. 30 tablet 2     No current facility-administered medications for this visit.       Allergies:   No Known Allergies    Vitals:   /74 (BP Location: Right arm, Patient Position: Sitting, Cuff Size: Adult)   Pulse 54   Temp 97.7 °F (36.5 °C) (Temporal)   Ht 177.8 cm (70\")   Wt 72.1 kg (159 lb)   SpO2 95%   BMI 22.81 kg/m²     Estimated body mass index is 22.81 kg/m² as calculated from the following:    Height as of this encounter: 177.8 cm (70\").    Weight as of this encounter: 72.1 kg (159 lb).    Dustin Jordan  reports that he has never smoked. His smokeless tobacco use includes chew.            Physical Exam:   Physical Exam  Constitutional:       Appearance: Normal appearance.   HENT:      Mouth/Throat:      Mouth: Mucous membranes are moist.   Cardiovascular:      Rate and Rhythm: Normal rate and regular rhythm.   Pulmonary:      Effort: Pulmonary effort is normal.      Breath sounds: Normal breath sounds. No wheezing or rhonchi.   Musculoskeletal:         General: Normal range of motion.      Right knee: Crepitus present. No swelling, deformity, ecchymosis or lacerations. Decreased range of motion. Tenderness present over the medial joint line.      Right lower leg: Edema (trace) present.   Skin:     General: Skin is warm and dry. "   Neurological:      Mental Status: He is alert.   Psychiatric:         Mood and Affect: Mood normal.          Lab Results:   Office Visit on 04/19/2024   Component Date Value Ref Range Status    Color 04/19/2024 Yellow  Yellow, Straw, Dark Yellow, Lizbeth Final    Clarity, UA 04/19/2024 Slightly Cloudy (A)  Clear Final    Specific Gravity  04/19/2024 1.015  1.005 - 1.030 Final    pH, Urine 04/19/2024 6.0  5.0 - 8.0 Final    Leukocytes 04/19/2024 Negative  Negative Final    Nitrite, UA 04/19/2024 Negative  Negative Final    Protein, POC 04/19/2024 Negative  Negative mg/dL Final    Glucose, UA 04/19/2024 Negative  Negative mg/dL Final    Ketones, UA 04/19/2024 Negative  Negative Final    Urobilinogen, UA 04/19/2024 Normal  Normal, 0.2 E.U./dL Final    Bilirubin 04/19/2024 Negative  Negative Final    Blood, UA 04/19/2024 Trace (A)  Negative Final    Lot Number 04/19/2024 [id number]   Final    Expiration Date 04/19/2024 1,142,025   Final   Office Visit on 04/02/2024   Component Date Value Ref Range Status    SARS Antigen 04/02/2024 Detected (A)  Not Detected, Presumptive Negative Final    Internal Control 04/02/2024 Passed  Passed Final    Lot Number 04/02/2024 [id number]   Final    Expiration Date 04/02/2024 2024-7-11   Final    Rapid Influenza A Ag 04/02/2024 Negative  Negative Final    Rapid Influenza B Ag 04/02/2024 Negative  Negative Final    Internal Control 04/02/2024 Passed  Passed Final    Lot Number 04/02/2024 [id number]   Final    Expiration Date 04/02/2024 2027-07-31   Final   Lab on 02/01/2024   Component Date Value Ref Range Status    WBC 02/01/2024 7.30  3.40 - 10.80 10*3/mm3 Final    RBC 02/01/2024 5.05  4.14 - 5.80 10*6/mm3 Final    Hemoglobin 02/01/2024 15.9  13.0 - 17.7 g/dL Final    Hematocrit 02/01/2024 45.6  37.5 - 51.0 % Final    MCV 02/01/2024 90.3  79.0 - 97.0 fL Final    MCH 02/01/2024 31.5  26.6 - 33.0 pg Final    MCHC 02/01/2024 34.9  31.5 - 35.7 g/dL Final    RDW 02/01/2024 12.2 (L)  12.3 -  15.4 % Final    RDW-SD 02/01/2024 40.3  37.0 - 54.0 fl Final    MPV 02/01/2024 8.6  6.0 - 12.0 fL Final    Platelets 02/01/2024 170  140 - 450 10*3/mm3 Final    Neutrophil % 02/01/2024 67.7  42.7 - 76.0 % Final    Lymphocyte % 02/01/2024 14.1 (L)  19.6 - 45.3 % Final    Monocyte % 02/01/2024 9.6  5.0 - 12.0 % Final    Eosinophil % 02/01/2024 3.8  0.3 - 6.2 % Final    Basophil % 02/01/2024 0.8  0.0 - 1.5 % Final    Immature Grans % 02/01/2024 4.0 (H)  0.0 - 0.5 % Final    Neutrophils, Absolute 02/01/2024 4.94  1.70 - 7.00 10*3/mm3 Final    Lymphocytes, Absolute 02/01/2024 1.03  0.70 - 3.10 10*3/mm3 Final    Monocytes, Absolute 02/01/2024 0.70  0.10 - 0.90 10*3/mm3 Final    Eosinophils, Absolute 02/01/2024 0.28  0.00 - 0.40 10*3/mm3 Final    Basophils, Absolute 02/01/2024 0.06  0.00 - 0.20 10*3/mm3 Final    Immature Grans, Absolute 02/01/2024 0.29 (H)  0.00 - 0.05 10*3/mm3 Final    nRBC 02/01/2024 0.0  0.0 - 0.2 /100 WBC Final    Glucose 02/01/2024 87  65 - 99 mg/dL Final    BUN 02/01/2024 10  8 - 23 mg/dL Final    Creatinine 02/01/2024 0.74 (L)  0.76 - 1.27 mg/dL Final    Sodium 02/01/2024 134 (L)  136 - 145 mmol/L Final    Potassium 02/01/2024 4.3  3.5 - 5.2 mmol/L Final    Chloride 02/01/2024 98  98 - 107 mmol/L Final    CO2 02/01/2024 23.8  22.0 - 29.0 mmol/L Final    Calcium 02/01/2024 8.7  8.6 - 10.5 mg/dL Final    Total Protein 02/01/2024 6.1  6.0 - 8.5 g/dL Final    Albumin 02/01/2024 4.1  3.5 - 5.2 g/dL Final    ALT (SGPT) 02/01/2024 19  1 - 41 U/L Final    AST (SGOT) 02/01/2024 21  1 - 40 U/L Final    Alkaline Phosphatase 02/01/2024 82  39 - 117 U/L Final    Total Bilirubin 02/01/2024 0.5  0.0 - 1.2 mg/dL Final    Globulin 02/01/2024 2.0  gm/dL Final    A/G Ratio 02/01/2024 2.1  g/dL Final    BUN/Creatinine Ratio 02/01/2024 13.5  7.0 - 25.0 Final    Anion Gap 02/01/2024 12.2  5.0 - 15.0 mmol/L Final    eGFR 02/01/2024 90.5  >60.0 mL/min/1.73 Final    Total Cholesterol 02/01/2024 174  0 - 200 mg/dL Final     Triglycerides 02/01/2024 62  0 - 150 mg/dL Final    HDL Cholesterol 02/01/2024 48  40 - 60 mg/dL Final    LDL Cholesterol  02/01/2024 114 (H)  0 - 100 mg/dL Final    VLDL Cholesterol 02/01/2024 12  5 - 40 mg/dL Final    LDL/HDL Ratio 02/01/2024 2.37   Final    PSA 02/01/2024 1.230  0.000 - 4.000 ng/mL Final   Office Visit on 01/23/2024   Component Date Value Ref Range Status    SARS Antigen 01/23/2024 Not Detected  Not Detected, Presumptive Negative Final    Influenza A Antigen DELMA 01/23/2024 Not Detected  Not Detected Final    Influenza B Antigen DELMA 01/23/2024 Not Detected  Not Detected Final    Internal Control 01/23/2024 Passed  Passed Final    Lot Number 01/23/2024 [id number]   Final    Expiration Date 01/23/2024 2025-01-17   Final       Assessment and Plan  Diagnoses and all orders for this visit:    1. Lower extremity edema (Primary)  -     furosemide (Lasix) 20 MG tablet; Take 1 tablet by mouth Daily.  Dispense: 5 tablet; Refill: 0    2. Benign prostatic hyperplasia with weak urinary stream  -     tadalafil (Cialis) 5 MG tablet; Take 1 tablet by mouth Daily As Needed for Erectile Dysfunction.  Dispense: 30 tablet; Refill: 2      Patient has trace pitting edema, I think this is likely due to OA inflammation. Patient has benefited from low dose lasix in the past. Will do the same.   Patient has BPH with LUTS, he has had sexual dysfunction with flomax. Will start cialis if affordable.   Patient has chronic right OA, confirmed on X-ray, wants to avoid surgery. Patient elects for knee injection. Patient tolerated well without complaints.     BMI is within normal parameters. No other follow-up for BMI required.       New Medications:   New Medications Ordered This Visit   Medications    furosemide (Lasix) 20 MG tablet     Sig: Take 1 tablet by mouth Daily.     Dispense:  5 tablet     Refill:  0    tadalafil (Cialis) 5 MG tablet     Sig: Take 1 tablet by mouth Daily As Needed for Erectile Dysfunction.     Dispense:   "30 tablet     Refill:  2       Discontinued Medications:   Medications Discontinued During This Encounter   Medication Reason    tamsulosin (FLOMAX) 0.4 MG capsule 24 hr capsule         Arthrocentesis    Date/Time: 5/28/2024 4:05 PM    Performed by: Diogo Faust DO  Authorized by: Diogo Faust DO  Consent: Verbal consent obtained. Written consent obtained.  Risks and benefits: risks, benefits and alternatives were discussed  Consent given by: patient  Patient understanding: patient states understanding of the procedure being performed  Patient consent: the patient's understanding of the procedure matches consent given  Procedure consent: procedure consent matches procedure scheduled  Relevant documents: relevant documents present and verified  Site marked: the operative site was marked  Required items: required blood products, implants, devices, and special equipment available  Patient identity confirmed: verbally with patient  Time out: Immediately prior to procedure a \"time out\" was called to verify the correct patient, procedure, equipment, support staff and site/side marked as required.  Indications: pain   Body area: knee  Joint: right knee  Preparation: Patient was prepped and draped in the usual sterile fashion.  Needle gauge: 23g.  Ultrasound guidance: no  Approach: anterior  Aspirate amount: 0 mL  Meds administered: 4 mL lidocaine 1 %; 40 mg triamcinolone acetonide 40 MG/ML  Patient tolerance: patient tolerated the procedure well with no immediate complications              Follow Up:   No follow-ups on file.    Patient was given instructions and counseling regarding his condition or for health maintenance advice. Please see specific information pulled into the AVS if appropriate.       This document has been electronically signed by Diogo Faust DO   May 28, 2024 16:02 EDT    Dictated Utilizing Dragon Dictation: Part of this note may be an electronic transcription/translation of spoken " language to printed text using the Dragon Dictation System.

## 2024-06-03 ENCOUNTER — TELEPHONE (OUTPATIENT)
Dept: FAMILY MEDICINE CLINIC | Facility: CLINIC | Age: 84
End: 2024-06-03
Payer: MEDICARE

## 2024-07-15 DIAGNOSIS — Q24.5: ICD-10-CM

## 2024-07-17 RX ORDER — METOPROLOL TARTRATE 50 MG/1
25 TABLET, FILM COATED ORAL DAILY
Qty: 90 TABLET | Refills: 1 | Status: SHIPPED | OUTPATIENT
Start: 2024-07-17

## 2024-07-18 ENCOUNTER — TELEPHONE (OUTPATIENT)
Dept: FAMILY MEDICINE CLINIC | Facility: CLINIC | Age: 84
End: 2024-07-18
Payer: MEDICARE

## 2024-07-18 NOTE — TELEPHONE ENCOUNTER
Pharmacy Name: [facility]    Reference Number (if applicable): [id number]    Pharmacy representative name: CHRISTIAN    Pharmacy representative phone number: [phone]    What medication are you calling in regards to: BPH    What question does the pharmacy have: CIALIS     Who is the provider that prescribed the medication: BRAD PAUL    Additional notes: IS THE PATIENT SUPPOSE TO TAKE THE CIALIS EVERY DAY FOR THE BPH OR JUST FOR ED. THE PATIENT IS CONFUSED.

## 2024-07-25 ENCOUNTER — TELEPHONE (OUTPATIENT)
Dept: FAMILY MEDICINE CLINIC | Facility: CLINIC | Age: 84
End: 2024-07-25

## 2024-07-25 DIAGNOSIS — R39.11 BENIGN PROSTATIC HYPERPLASIA WITH URINARY HESITANCY: ICD-10-CM

## 2024-07-25 DIAGNOSIS — N40.1 BENIGN PROSTATIC HYPERPLASIA WITH URINARY HESITANCY: ICD-10-CM

## 2024-07-25 RX ORDER — TAMSULOSIN HYDROCHLORIDE 0.4 MG/1
1 CAPSULE ORAL DAILY
Qty: 30 CAPSULE | Refills: 2 | OUTPATIENT
Start: 2024-07-25

## 2024-07-30 ENCOUNTER — OFFICE VISIT (OUTPATIENT)
Dept: FAMILY MEDICINE CLINIC | Facility: CLINIC | Age: 84
End: 2024-07-30
Payer: MEDICARE

## 2024-07-30 VITALS
DIASTOLIC BLOOD PRESSURE: 74 MMHG | TEMPERATURE: 97.3 F | OXYGEN SATURATION: 94 % | HEART RATE: 63 BPM | WEIGHT: 157.6 LBS | BODY MASS INDEX: 22.56 KG/M2 | SYSTOLIC BLOOD PRESSURE: 136 MMHG | HEIGHT: 70 IN

## 2024-07-30 DIAGNOSIS — N40.1 BENIGN PROSTATIC HYPERPLASIA WITH URINARY FREQUENCY: ICD-10-CM

## 2024-07-30 DIAGNOSIS — R19.7 ACUTE DIARRHEA: Primary | ICD-10-CM

## 2024-07-30 DIAGNOSIS — R35.0 BENIGN PROSTATIC HYPERPLASIA WITH URINARY FREQUENCY: ICD-10-CM

## 2024-07-30 PROCEDURE — 99213 OFFICE O/P EST LOW 20 MIN: CPT | Performed by: STUDENT IN AN ORGANIZED HEALTH CARE EDUCATION/TRAINING PROGRAM

## 2024-07-30 PROCEDURE — 1126F AMNT PAIN NOTED NONE PRSNT: CPT | Performed by: STUDENT IN AN ORGANIZED HEALTH CARE EDUCATION/TRAINING PROGRAM

## 2024-07-31 NOTE — PROGRESS NOTES
Chief Complaint  Diarrhea    HPI:   Diarrhea        Dustin Jordan is a 83 y.o. male who presents today to [facility] for Diarrhea. Patient presents for acute complaint of diarrhea. He reports the diarrhea has been ongoing for about 1 week. He denies nausea, vomiting, sick exposures or recent travel. The diarrhea is non-bloody. He denies abdominal pain.     Previous History:   Past Medical History:   Diagnosis Date    Acid reflux     Arthritis     Environmental allergies     Hypertension     STATES NO HIGH BLOOD PRESSURE. BUT XTRA VEIN IN HEART    Kidney stones     Macular degeneration       Past Surgical History:   Procedure Laterality Date    ESOPHAGEAL DILATATION      HERNIA REPAIR      KNEE ARTHROSCOPY Right     KNEE SURGERY      TOTAL HIP ARTHROPLASTY Left 8/9/2016    Procedure: TOTAL HIP ARTHROPLASTY;  Surgeon: Yoel Menjivar MD;  Location: [facility];  Service:     TOTAL HIP ARTHROPLASTY        Social History     Socioeconomic History    Marital status:    Tobacco Use    Smoking status: Never    Smokeless tobacco: Current     Types: Chew   Vaping Use    Vaping status: Never Used   Substance and Sexual Activity    Alcohol use: No    Drug use: No    Sexual activity: Defer      Health Maintenance Due   Topic Date Due    TDAP/TD VACCINES (1 - Tdap) Never done    ZOSTER VACCINE (1 of 2) Never done    COVID-19 Vaccine (3 - 2023-24 season) 09/01/2023    ANNUAL WELLNESS VISIT  06/13/2024        Current Medications:  Current Outpatient Medications   Medication Sig Dispense Refill    aspirin 81 MG chewable tablet Chew 2 tablets Daily.      cetirizine (zyrTEC) 10 MG tablet Take 0.5 tablets by mouth Daily. 30 tablet 2    Cholecalciferol (Vitamin D3) 50 MCG (2000 UT) capsule Take 1 capsule by mouth Daily. 30 capsule 11    Krill Oil 500 MG capsule Take 1 capsule by mouth Daily. 90 capsule 11    metoprolol tartrate (LOPRESSOR) 50 MG tablet TAKE 1/2 TABLET BY MOUTH TWICE DAILY 90  "tablet 1    Multiple Vitamins-Minerals (VITEYES AREDS FORMULA/LUTEIN) capsule Take 1 capsule by mouth daily.      ondansetron ODT (ZOFRAN-ODT) 4 MG disintegrating tablet Place 1 tablet on the tongue Every 8 (Eight) Hours As Needed for Nausea or Vomiting. 30 tablet 0    tadalafil (Cialis) 5 MG tablet Take 1 tablet by mouth Daily As Needed for Erectile Dysfunction. 30 tablet 2     No current facility-administered medications for this visit.       Allergies:   No Known Allergies    Vitals:   /74 (BP Location: Right arm, Patient Position: Sitting, Cuff Size: Adult)   Pulse 63   Temp 97.3 °F (36.3 °C) (Temporal)   Ht 177.8 cm (70\")   Wt 71.5 kg (157 lb 9.6 oz)   SpO2 94%   BMI 22.61 kg/m²     Estimated body mass index is 22.61 kg/m² as calculated from the following:    Height as of this encounter: 177.8 cm (70\").    Weight as of this encounter: 71.5 kg (157 lb 9.6 oz).    Dustin Jordan  reports that he has never smoked. His smokeless tobacco use includes chew.            Physical Exam:   Physical Exam  Constitutional:       Appearance: Normal appearance.   HENT:      Mouth/Throat:      Mouth: Mucous membranes are moist.   Cardiovascular:      Rate and Rhythm: Normal rate and regular rhythm.   Pulmonary:      Effort: Pulmonary effort is normal.      Breath sounds: Normal breath sounds. No wheezing or rhonchi.   Abdominal:      General: Abdomen is flat. There is no distension.      Palpations: Abdomen is soft. There is no mass.      Tenderness: There is abdominal tenderness (Mild LLQ tenderness).      Hernia: No hernia is present.   Musculoskeletal:         General: Normal range of motion.   Skin:     General: Skin is warm and dry.   Neurological:      Mental Status: He is alert.   Psychiatric:         Mood and Affect: Mood normal.          Lab Results:   Office Visit on 04/19/2024   Component Date Value Ref Range Status    Color 04/19/2024 Yellow  Yellow, Straw, Dark Yellow, Lizbeth Final    Clarity, UA " 04/19/2024 Slightly Cloudy (A)  Clear Final    Specific Gravity  04/19/2024 1.015  1.005 - 1.030 Final    pH, Urine 04/19/2024 6.0  5.0 - 8.0 Final    Leukocytes 04/19/2024 Negative  Negative Final    Nitrite, UA 04/19/2024 Negative  Negative Final    Protein, POC 04/19/2024 Negative  Negative mg/dL Final    Glucose, UA 04/19/2024 Negative  Negative mg/dL Final    Ketones, UA 04/19/2024 Negative  Negative Final    Urobilinogen, UA 04/19/2024 Normal  Normal, 0.2 E.U./dL Final    Bilirubin 04/19/2024 Negative  Negative Final    Blood, UA 04/19/2024 Trace (A)  Negative Final    Lot Number 04/19/2024 [id number]   Final    Expiration Date 04/19/2024 1,142,025   Final   Office Visit on 04/02/2024   Component Date Value Ref Range Status    SARS Antigen 04/02/2024 Detected (A)  Not Detected, Presumptive Negative Final    Internal Control 04/02/2024 Passed  Passed Final    Lot Number 04/02/2024 [id number]   Final    Expiration Date 04/02/2024 2024-7-11   Final    Rapid Influenza A Ag 04/02/2024 Negative  Negative Final    Rapid Influenza B Ag 04/02/2024 Negative  Negative Final    Internal Control 04/02/2024 Passed  Passed Final    Lot Number 04/02/2024 [id number]   Final    Expiration Date 04/02/2024 2027-07-31   Final   Lab on 02/01/2024   Component Date Value Ref Range Status    WBC 02/01/2024 7.30  3.40 - 10.80 10*3/mm3 Final    RBC 02/01/2024 5.05  4.14 - 5.80 10*6/mm3 Final    Hemoglobin 02/01/2024 15.9  13.0 - 17.7 g/dL Final    Hematocrit 02/01/2024 45.6  37.5 - 51.0 % Final    MCV 02/01/2024 90.3  79.0 - 97.0 fL Final    MCH 02/01/2024 31.5  26.6 - 33.0 pg Final    MCHC 02/01/2024 34.9  31.5 - 35.7 g/dL Final    RDW 02/01/2024 12.2 (L)  12.3 - 15.4 % Final    RDW-SD 02/01/2024 40.3  37.0 - 54.0 fl Final    MPV 02/01/2024 8.6  6.0 - 12.0 fL Final    Platelets 02/01/2024 170  140 - 450 10*3/mm3 Final    Neutrophil % 02/01/2024 67.7  42.7 - 76.0 % Final    Lymphocyte % 02/01/2024 14.1 (L)  19.6 - 45.3 % Final     Monocyte % 02/01/2024 9.6  5.0 - 12.0 % Final    Eosinophil % 02/01/2024 3.8  0.3 - 6.2 % Final    Basophil % 02/01/2024 0.8  0.0 - 1.5 % Final    Immature Grans % 02/01/2024 4.0 (H)  0.0 - 0.5 % Final    Neutrophils, Absolute 02/01/2024 4.94  1.70 - 7.00 10*3/mm3 Final    Lymphocytes, Absolute 02/01/2024 1.03  0.70 - 3.10 10*3/mm3 Final    Monocytes, Absolute 02/01/2024 0.70  0.10 - 0.90 10*3/mm3 Final    Eosinophils, Absolute 02/01/2024 0.28  0.00 - 0.40 10*3/mm3 Final    Basophils, Absolute 02/01/2024 0.06  0.00 - 0.20 10*3/mm3 Final    Immature Grans, Absolute 02/01/2024 0.29 (H)  0.00 - 0.05 10*3/mm3 Final    nRBC 02/01/2024 0.0  0.0 - 0.2 /100 WBC Final    Glucose 02/01/2024 87  65 - 99 mg/dL Final    BUN 02/01/2024 10  8 - 23 mg/dL Final    Creatinine 02/01/2024 0.74 (L)  0.76 - 1.27 mg/dL Final    Sodium 02/01/2024 134 (L)  136 - 145 mmol/L Final    Potassium 02/01/2024 4.3  3.5 - 5.2 mmol/L Final    Chloride 02/01/2024 98  98 - 107 mmol/L Final    CO2 02/01/2024 23.8  22.0 - 29.0 mmol/L Final    Calcium 02/01/2024 8.7  8.6 - 10.5 mg/dL Final    Total Protein 02/01/2024 6.1  6.0 - 8.5 g/dL Final    Albumin 02/01/2024 4.1  3.5 - 5.2 g/dL Final    ALT (SGPT) 02/01/2024 19  1 - 41 U/L Final    AST (SGOT) 02/01/2024 21  1 - 40 U/L Final    Alkaline Phosphatase 02/01/2024 82  39 - 117 U/L Final    Total Bilirubin 02/01/2024 0.5  0.0 - 1.2 mg/dL Final    Globulin 02/01/2024 2.0  gm/dL Final    A/G Ratio 02/01/2024 2.1  g/dL Final    BUN/Creatinine Ratio 02/01/2024 13.5  7.0 - 25.0 Final    Anion Gap 02/01/2024 12.2  5.0 - 15.0 mmol/L Final    eGFR 02/01/2024 90.5  >60.0 mL/min/1.73 Final    Total Cholesterol 02/01/2024 174  0 - 200 mg/dL Final    Triglycerides 02/01/2024 62  0 - 150 mg/dL Final    HDL Cholesterol 02/01/2024 48  40 - 60 mg/dL Final    LDL Cholesterol  02/01/2024 114 (H)  0 - 100 mg/dL Final    VLDL Cholesterol 02/01/2024 12  5 - 40 mg/dL Final    LDL/HDL Ratio 02/01/2024 2.37   Final    PSA  02/01/2024 1.230  0.000 - 4.000 ng/mL Final       Assessment and Plan  Diagnoses and all orders for this visit:    1. Acute diarrhea (Primary)    2. Benign prostatic hyperplasia with urinary frequency    Acute diarrhea, mild LLQ tenderness history of diverticulosis. Differential is viral gastroenteritis vs mild diverticulitis. We discussed conservative management, staying hydrated. No invasive tests unless persistent or worsening.   Patient has BPH, doing well with cialis. Continue Cialis.     BMI is within normal parameters. No other follow-up for BMI required.       New Medications:   No orders of the defined types were placed in this encounter.      Discontinued Medications:   Medications Discontinued During This Encounter   Medication Reason    furosemide (Lasix) 20 MG tablet *Therapy completed                 Follow Up:   No follow-ups on file.    Patient was given instructions and counseling regarding his condition or for health maintenance advice. Please see specific information pulled into the AVS if appropriate.       This document has been electronically signed by Diogo Faust DO   July 31, 2024 08:36 EDT    Dictated Utilizing Dragon Dictation: Part of this note may be an electronic transcription/translation of spoken language to printed text using the Dragon Dictation System.

## 2024-08-01 NOTE — TELEPHONE ENCOUNTER
"PA for Tadalafil has been denied. The denial states the following:    \"Tadalafil (generic Cialis) is denied because it is not on your plan's Drug List (formulary).    Medication authorization requires the following:    (1) You need to try four (4) of these covered drugs:  (a) Alfuzosin ER.  (b) Doxazosin.  (c) Silodosin.  (d) Terazosin.    (2) OR your doctor needs to give us specific medical reasons why four (4) of the covered drug(s) are not appropriate for you.\"    Dr. Estrada, what would you like done?  "

## 2024-08-06 ENCOUNTER — OFFICE VISIT (OUTPATIENT)
Dept: FAMILY MEDICINE CLINIC | Facility: CLINIC | Age: 84
End: 2024-08-06
Payer: MEDICARE

## 2024-08-06 ENCOUNTER — LAB (OUTPATIENT)
Dept: FAMILY MEDICINE CLINIC | Facility: CLINIC | Age: 84
End: 2024-08-06
Payer: MEDICARE

## 2024-08-06 VITALS
DIASTOLIC BLOOD PRESSURE: 70 MMHG | WEIGHT: 156 LBS | BODY MASS INDEX: 22.33 KG/M2 | RESPIRATION RATE: 16 BRPM | HEIGHT: 70 IN | HEART RATE: 58 BPM | SYSTOLIC BLOOD PRESSURE: 136 MMHG | OXYGEN SATURATION: 95 % | TEMPERATURE: 97.3 F

## 2024-08-06 DIAGNOSIS — G93.32 POST-COVID CHRONIC FATIGUE: ICD-10-CM

## 2024-08-06 DIAGNOSIS — U09.9 POST-COVID CHRONIC FATIGUE: ICD-10-CM

## 2024-08-06 DIAGNOSIS — R39.12 BENIGN PROSTATIC HYPERPLASIA WITH WEAK URINARY STREAM: ICD-10-CM

## 2024-08-06 DIAGNOSIS — E53.8 B12 DEFICIENCY: ICD-10-CM

## 2024-08-06 DIAGNOSIS — Z00.00 ENCOUNTER FOR SUBSEQUENT ANNUAL WELLNESS VISIT IN MEDICARE PATIENT: Primary | ICD-10-CM

## 2024-08-06 DIAGNOSIS — N40.1 BENIGN PROSTATIC HYPERPLASIA WITH WEAK URINARY STREAM: ICD-10-CM

## 2024-08-06 PROCEDURE — 1160F RVW MEDS BY RX/DR IN RCRD: CPT | Performed by: FAMILY MEDICINE

## 2024-08-06 PROCEDURE — 84443 ASSAY THYROID STIM HORMONE: CPT | Performed by: FAMILY MEDICINE

## 2024-08-06 PROCEDURE — 96372 THER/PROPH/DIAG INJ SC/IM: CPT | Performed by: FAMILY MEDICINE

## 2024-08-06 PROCEDURE — 1159F MED LIST DOCD IN RCRD: CPT | Performed by: FAMILY MEDICINE

## 2024-08-06 PROCEDURE — 1126F AMNT PAIN NOTED NONE PRSNT: CPT | Performed by: FAMILY MEDICINE

## 2024-08-06 PROCEDURE — 80053 COMPREHEN METABOLIC PANEL: CPT | Performed by: FAMILY MEDICINE

## 2024-08-06 PROCEDURE — G0439 PPPS, SUBSEQ VISIT: HCPCS | Performed by: FAMILY MEDICINE

## 2024-08-06 RX ORDER — TADALAFIL 5 MG/1
5 TABLET ORAL DAILY PRN
Qty: 30 TABLET | Refills: 2 | Status: SHIPPED | OUTPATIENT
Start: 2024-08-06

## 2024-08-06 RX ORDER — CYANOCOBALAMIN 1000 UG/ML
1000 INJECTION, SOLUTION INTRAMUSCULAR; SUBCUTANEOUS ONCE
Status: COMPLETED | OUTPATIENT
Start: 2024-08-06 | End: 2024-08-06

## 2024-08-06 RX ADMIN — CYANOCOBALAMIN 1000 MCG: 1000 INJECTION, SOLUTION INTRAMUSCULAR; SUBCUTANEOUS at 12:13

## 2024-08-06 NOTE — TELEPHONE ENCOUNTER
I put tadalafil under erectile dysfunction instead of BPH. Can you see if Zoya will take it now before talking to the insurance company again?

## 2024-08-07 LAB
ALBUMIN SERPL-MCNC: 4.4 G/DL (ref 3.5–5.2)
ALBUMIN/GLOB SERPL: 2.3 G/DL
ALP SERPL-CCNC: 92 U/L (ref 39–117)
ALT SERPL W P-5'-P-CCNC: 16 U/L (ref 1–41)
ANION GAP SERPL CALCULATED.3IONS-SCNC: 11 MMOL/L (ref 5–15)
AST SERPL-CCNC: 22 U/L (ref 1–40)
BILIRUB SERPL-MCNC: 0.7 MG/DL (ref 0–1.2)
BUN SERPL-MCNC: 10 MG/DL (ref 8–23)
BUN/CREAT SERPL: 12.8 (ref 7–25)
CALCIUM SPEC-SCNC: 9.3 MG/DL (ref 8.6–10.5)
CHLORIDE SERPL-SCNC: 100 MMOL/L (ref 98–107)
CO2 SERPL-SCNC: 25 MMOL/L (ref 22–29)
CREAT SERPL-MCNC: 0.78 MG/DL (ref 0.76–1.27)
EGFRCR SERPLBLD CKD-EPI 2021: 89 ML/MIN/1.73
GLOBULIN UR ELPH-MCNC: 1.9 GM/DL
GLUCOSE SERPL-MCNC: 88 MG/DL (ref 65–99)
POTASSIUM SERPL-SCNC: 5.4 MMOL/L (ref 3.5–5.2)
PROT SERPL-MCNC: 6.3 G/DL (ref 6–8.5)
SODIUM SERPL-SCNC: 136 MMOL/L (ref 136–145)
TSH SERPL DL<=0.05 MIU/L-ACNC: 3.3 UIU/ML (ref 0.27–4.2)

## 2024-08-21 NOTE — PROGRESS NOTES
Subjective   The ABCs of the Annual Wellness Visit  Medicare Wellness Visit      Dustin Jordan is a 83 y.o. patient with medical conditions significant for hypertension and allergic rhinitis who presents for a Medicare Wellness Visit.    He does complain of ear fullness today.  He also does complain of getting up in the middle of the night to urinate.    The following portions of the patient's history were reviewed and   updated as appropriate: allergies, current medications, past family history, past medical history, past social history, past surgical history, and problem list.    Compared to one year ago, the patient's physical   health is the same.  Compared to one year ago, the patient's mental   health is the same.    Recent Hospitalizations:  He was not admitted to the hospital during the last year.     Current Medical Providers:  Patient Care Team:  Yane Estrada MD as PCP - General (Family Medicine)  Larry Saxena MD (Inactive)  Dmitry Benitez MD as Consulting Physician (Ophthalmology)  Ashlie Webber MD (Otolaryngology)    Outpatient Medications Prior to Visit   Medication Sig Dispense Refill    aspirin 81 MG chewable tablet Chew 2 tablets Daily.      cetirizine (zyrTEC) 10 MG tablet Take 0.5 tablets by mouth Daily. (Patient taking differently: Take 1 tablet by mouth Daily.) 30 tablet 2    Cholecalciferol (Vitamin D3) 50 MCG (2000 UT) capsule Take 1 capsule by mouth Daily. 30 capsule 11    Krill Oil 500 MG capsule Take 1 capsule by mouth Daily. 90 capsule 11    metoprolol tartrate (LOPRESSOR) 50 MG tablet TAKE 1/2 TABLET BY MOUTH TWICE DAILY 90 tablet 1    Multiple Vitamins-Minerals (VITEYES AREDS FORMULA/LUTEIN) capsule Take 1 capsule by mouth daily.      ondansetron ODT (ZOFRAN-ODT) 4 MG disintegrating tablet Place 1 tablet on the tongue Every 8 (Eight) Hours As Needed for Nausea or Vomiting. 30 tablet 0    tadalafil (Cialis) 5 MG tablet Take 1 tablet by mouth Daily As Needed for Erectile  "Dysfunction. 30 tablet 2     No facility-administered medications prior to visit.     No opioid medication identified on active medication list. I have reviewed chart for other potential  high risk medication/s and harmful drug interactions in the elderly.      Aspirin is on active medication list. Aspirin use is indicated based on review of current medical condition/s. Pros and cons of this therapy have been discussed today. Benefits of this medication outweigh potential harm.  Patient has been encouraged to continue taking this medication.  .      Patient Active Problem List   Diagnosis    Hip pain, left    Acid reflux    Environmental allergies    Primary osteoarthritis of right knee    Microscopic hematuria    History of total hip arthroplasty, left    Hematuria     Advance Care Planning Advance Directive is not on file.  ACP discussion was held with the patient during this visit. Patient does not have an advance directive, information provided.            Objective   Vitals:    08/06/24 1049   BP: 136/70   BP Location: Right arm   Patient Position: Sitting   Cuff Size: Adult   Pulse: 58   Resp: 16   Temp: 97.3 °F (36.3 °C)   TempSrc: Temporal   SpO2: 95%   Weight: 70.8 kg (156 lb)   Height: 177.8 cm (70\")   PainSc: 0-No pain       Estimated body mass index is 22.38 kg/m² as calculated from the following:    Height as of this encounter: 177.8 cm (70\").    Weight as of this encounter: 70.8 kg (156 lb).    BMI is within normal parameters. No other follow-up for BMI required.       Does the patient have evidence of cognitive impairment? No         Balance and gait within normal limits.    Gen: Patient in NAD. Pleasant and answers appropriately. A&Ox3.    Skin: Warm and dry with normal turgor. No purpura, rashes, or unusual pigmentation noted. Hair is normal in appearance and distribution.    HEENT: NC/AT. No lesions noted. Conjunctiva clear, sclera nonicteric. PERRL. EOMI without nystagmus or strabismus. Fundi " appear benign. No hemorrhages or exudates of eyes. Auditory canals are patent bilaterally without lesions. TMs intact,  nonerythematous, bulging without lesions. Nasal mucosa pink, nonerythematous, and nonedematous. Frontal and maxillary sinuses are nontender. O/P nonerythematous and moist without exudate.    Neck: Supple without lymph nodes palpated. FROM.     Lungs: CTA B/L without rales, rhonchi, crackles, or wheezes.    Heart: RRR. S1 and S2 normal. No S3 or S4. No MRGT.    Abd: Soft, nontender,nondistended. (+)BSx4 quadrants.     Extrem: No CCE. Radial pulses 2+/4 and equal B/L. FROMx4. No bone, joint, or muscle tenderness noted.    Neuro: No focal motor/sensory deficits.                                                                                        Health  Risk Assessment    Smoking Status:  Social History     Tobacco Use   Smoking Status Never   Smokeless Tobacco Current    Types: Chew     Alcohol Consumption:  Social History     Substance and Sexual Activity   Alcohol Use No       Fall Risk Screen  STEADI Fall Risk Assessment was completed, and patient is at LOW risk for falls.Assessment completed on:2024    Depression Screenin/6/2024    10:53 AM   PHQ-2/PHQ-9 Depression Screening   Little Interest or Pleasure in Doing Things 0-->not at all   Feeling Down, Depressed or Hopeless 0-->not at all   PHQ-9: Brief Depression Severity Measure Score 0     Health Habits and Functional and Cognitive Screenin/6/2024    10:56 AM   Functional & Cognitive Status   Do you have difficulty preparing food and eating? No   Do you have difficulty bathing yourself, getting dressed or grooming yourself? No   Do you have difficulty using the toilet? No   Do you have difficulty moving around from place to place? No   Do you have trouble with steps or getting out of a bed or a chair? No   Current Diet Well Balanced Diet   Dental Exam Up to date   Eye Exam Up to date   Exercise (times per week) 7 times  per week   Current Exercises Include Yard Work;Walking   Do you need help using the phone?  No   Are you deaf or do you have serious difficulty hearing?  Yes   Do you need help to go to places out of walking distance? No   Do you need help shopping? No   Do you need help preparing meals?  No   Do you need help with housework?  No   Do you need help with laundry? No   Do you need help taking your medications? No   Do you need help managing money? No   Do you ever drive or ride in a car without wearing a seat belt? No   Have you felt unusual stress, anger or loneliness in the last month? No   Who do you live with? Spouse   If you need help, do you have trouble finding someone available to you? No   Have you been bothered in the last four weeks by sexual problems? No   Do you have difficulty concentrating, remembering or making decisions? No           Age-appropriate Screening Schedule:  Refer to the list below for future screening recommendations based on patient's age, sex and/or medical conditions. Orders for these recommended tests are listed in the plan section. The patient has been provided with a written plan.    Health Maintenance List  Health Maintenance   Topic Date Due    TDAP/TD VACCINES (1 - Tdap) Never done    ZOSTER VACCINE (1 of 2) Never done    COVID-19 Vaccine (3 - 2023-24 season) 09/01/2023    INFLUENZA VACCINE  08/01/2024    RSV Vaccine - Adults (1 - 1-dose 60+ series) 02/01/2025 (Originally 9/18/2001)    Pneumococcal Vaccine 65+ (1 of 1 - PCV) 02/06/2025 (Originally 9/18/2006)    LIPID PANEL  02/01/2025    ANNUAL WELLNESS VISIT  08/06/2025                                                                                                                                                CMS Preventative Services Quick Reference  Risk Factors Identified During Encounter  None Identified    The above risks/problems have been discussed with the patient.  Pertinent information has been shared with the patient  in the After Visit Summary.  An After Visit Summary and PPPS were made available to the patient.    Follow Up:   Next Medicare Wellness visit to be scheduled in 1 year.     Assessment & Plan  Benign prostatic hyperplasia with weak urinary stream    Post-COVID chronic fatigue    B12 deficiency    Encounter for subsequent annual wellness visit in Medicare patient    Orders Placed This Encounter   Procedures    Comprehensive Metabolic Panel    TSH Rfx On Abnormal To Free T4     New Medications Ordered This Visit   Medications    tadalafil (Cialis) 5 MG tablet     Sig: Take 1 tablet by mouth Daily As Needed for Erectile Dysfunction.     Dispense:  30 tablet     Refill:  2    cyanocobalamin injection 1,000 mcg      Diagnoses and all orders for this visit:    1. Encounter for subsequent annual wellness visit in Medicare patient (Primary)    2. Benign prostatic hyperplasia with weak urinary stream  -     Comprehensive Metabolic Panel; Future    3. Post-COVID chronic fatigue  -     Comprehensive Metabolic Panel; Future  -     TSH Rfx On Abnormal To Free T4; Future    4. B12 deficiency  -     cyanocobalamin injection 1,000 mcg    Other orders  -     tadalafil (Cialis) 5 MG tablet; Take 1 tablet by mouth Daily As Needed for Erectile Dysfunction.  Dispense: 30 tablet; Refill: 2          Follow Up:   Return in about 6 months (around 2/6/2025), or LABS.

## 2024-08-26 ENCOUNTER — OFFICE VISIT (OUTPATIENT)
Dept: FAMILY MEDICINE CLINIC | Facility: CLINIC | Age: 84
End: 2024-08-26
Payer: MEDICARE

## 2024-08-26 VITALS
WEIGHT: 156 LBS | TEMPERATURE: 97.6 F | HEIGHT: 70 IN | DIASTOLIC BLOOD PRESSURE: 76 MMHG | HEART RATE: 70 BPM | BODY MASS INDEX: 22.33 KG/M2 | OXYGEN SATURATION: 96 % | SYSTOLIC BLOOD PRESSURE: 124 MMHG

## 2024-08-26 DIAGNOSIS — R19.7 DIARRHEA OF PRESUMED INFECTIOUS ORIGIN: ICD-10-CM

## 2024-08-26 DIAGNOSIS — K51.519 LEFT SIDED COLITIS WITH UNSPECIFIED COMPLICATIONS: ICD-10-CM

## 2024-08-26 DIAGNOSIS — R10.84 GENERALIZED ABDOMINAL PAIN: Primary | ICD-10-CM

## 2024-08-26 PROCEDURE — 99214 OFFICE O/P EST MOD 30 MIN: CPT | Performed by: STUDENT IN AN ORGANIZED HEALTH CARE EDUCATION/TRAINING PROGRAM

## 2024-08-26 PROCEDURE — 1126F AMNT PAIN NOTED NONE PRSNT: CPT | Performed by: STUDENT IN AN ORGANIZED HEALTH CARE EDUCATION/TRAINING PROGRAM

## 2024-08-26 NOTE — PROGRESS NOTES
Chief Complaint  Acute diarrhea    HPI:   OLU Jordan is a 83 y.o. male who presents today to [facility] for Acute diarrhea. Patient seen in follow up for abdominal pain and diarrhea. Patient has been seen multiple times for this issue. At the last visit we thought he may have diverticulitis. Conservative treatment was decided. He initially improved but has had worsening bloating, abdominal pain and diarrhea.     Previous History:   Past Medical History:   Diagnosis Date    Acid reflux     Arthritis     Environmental allergies     Hypertension     STATES NO HIGH BLOOD PRESSURE. BUT XTRA VEIN IN HEART    Kidney stones     Macular degeneration       Past Surgical History:   Procedure Laterality Date    ESOPHAGEAL DILATATION      HERNIA REPAIR      KNEE ARTHROSCOPY Right     KNEE SURGERY      TOTAL HIP ARTHROPLASTY Left 8/9/2016    Procedure: TOTAL HIP ARTHROPLASTY;  Surgeon: Yoel Menjivar MD;  Location: [facility];  Service:     TOTAL HIP ARTHROPLASTY        Social History     Socioeconomic History    Marital status:    Tobacco Use    Smoking status: Never    Smokeless tobacco: Current     Types: Chew   Vaping Use    Vaping status: Never Used   Substance and Sexual Activity    Alcohol use: No    Drug use: No    Sexual activity: Defer      Health Maintenance Due   Topic Date Due    TDAP/TD VACCINES (1 - Tdap) Never done    ZOSTER VACCINE (1 of 2) Never done    COVID-19 Vaccine (3 - 2023-24 season) 09/01/2023    INFLUENZA VACCINE  08/01/2024        Current Medications:  Current Outpatient Medications   Medication Sig Dispense Refill    aspirin 81 MG chewable tablet Chew 2 tablets Daily.      cetirizine (zyrTEC) 10 MG tablet Take 0.5 tablets by mouth Daily. (Patient taking differently: Take 1 tablet by mouth Daily.) 30 tablet 2    Cholecalciferol (Vitamin D3) 50 MCG (2000 UT) capsule Take 1 capsule by mouth Daily. 30 capsule 11    Krill Oil 500 MG capsule Take 1  "capsule by mouth Daily. 90 capsule 11    metoprolol tartrate (LOPRESSOR) 50 MG tablet TAKE 1/2 TABLET BY MOUTH TWICE DAILY 90 tablet 1    Multiple Vitamins-Minerals (VITEYES AREDS FORMULA/LUTEIN) capsule Take 1 capsule by mouth daily.      ondansetron ODT (ZOFRAN-ODT) 4 MG disintegrating tablet Place 1 tablet on the tongue Every 8 (Eight) Hours As Needed for Nausea or Vomiting. 30 tablet 0    tadalafil (Cialis) 5 MG tablet Take 1 tablet by mouth Daily As Needed for Erectile Dysfunction. 30 tablet 2     No current facility-administered medications for this visit.       Allergies:   No Known Allergies    Vitals:   /76 (BP Location: Right arm, Patient Position: Sitting, Cuff Size: Adult)   Pulse 70   Temp 97.6 °F (36.4 °C) (Temporal)   Ht 177.8 cm (70\")   Wt 70.8 kg (156 lb)   SpO2 96%   BMI 22.38 kg/m²     Estimated body mass index is 22.38 kg/m² as calculated from the following:    Height as of this encounter: 177.8 cm (70\").    Weight as of this encounter: 70.8 kg (156 lb).    Dustin Jordan  reports that he has never smoked. His smokeless tobacco use includes chew.           Physical Exam:   Physical Exam  Constitutional:       Appearance: Normal appearance.   HENT:      Mouth/Throat:      Mouth: Mucous membranes are moist.   Cardiovascular:      Rate and Rhythm: Normal rate and regular rhythm.   Pulmonary:      Effort: Pulmonary effort is normal.      Breath sounds: Normal breath sounds. No wheezing or rhonchi.   Abdominal:      General: Abdomen is flat. There is no distension.      Palpations: Abdomen is soft.      Tenderness: There is abdominal tenderness (Left lower quadrant tenderness). There is no guarding.   Musculoskeletal:         General: Normal range of motion.   Skin:     General: Skin is warm and dry.   Neurological:      Mental Status: He is alert.   Psychiatric:         Mood and Affect: Mood normal.          Lab Results:   Lab on 08/06/2024   Component Date Value Ref Range Status    Glucose " 08/06/2024 88  65 - 99 mg/dL Final    BUN 08/06/2024 10  8 - 23 mg/dL Final    Creatinine 08/06/2024 0.78  0.76 - 1.27 mg/dL Final    Sodium 08/06/2024 136  136 - 145 mmol/L Final    Potassium 08/06/2024 5.4 (H)  3.5 - 5.2 mmol/L Final    Chloride 08/06/2024 100  98 - 107 mmol/L Final    CO2 08/06/2024 25.0  22.0 - 29.0 mmol/L Final    Calcium 08/06/2024 9.3  8.6 - 10.5 mg/dL Final    Total Protein 08/06/2024 6.3  6.0 - 8.5 g/dL Final    Albumin 08/06/2024 4.4  3.5 - 5.2 g/dL Final    ALT (SGPT) 08/06/2024 16  1 - 41 U/L Final    AST (SGOT) 08/06/2024 22  1 - 40 U/L Final    Alkaline Phosphatase 08/06/2024 92  39 - 117 U/L Final    Total Bilirubin 08/06/2024 0.7  0.0 - 1.2 mg/dL Final    Globulin 08/06/2024 1.9  gm/dL Final    A/G Ratio 08/06/2024 2.3  g/dL Final    BUN/Creatinine Ratio 08/06/2024 12.8  7.0 - 25.0 Final    Anion Gap 08/06/2024 11.0  5.0 - 15.0 mmol/L Final    eGFR 08/06/2024 89.0  >60.0 mL/min/1.73 Final    TSH 08/06/2024 3.300  0.270 - 4.200 uIU/mL Final   Office Visit on 04/19/2024   Component Date Value Ref Range Status    Color 04/19/2024 Yellow  Yellow, Straw, Dark Yellow, Lizbeth Final    Clarity, UA 04/19/2024 Slightly Cloudy (A)  Clear Final    Specific Gravity  04/19/2024 1.015  1.005 - 1.030 Final    pH, Urine 04/19/2024 6.0  5.0 - 8.0 Final    Leukocytes 04/19/2024 Negative  Negative Final    Nitrite, UA 04/19/2024 Negative  Negative Final    Protein, POC 04/19/2024 Negative  Negative mg/dL Final    Glucose, UA 04/19/2024 Negative  Negative mg/dL Final    Ketones, UA 04/19/2024 Negative  Negative Final    Urobilinogen, UA 04/19/2024 Normal  Normal, 0.2 E.U./dL Final    Bilirubin 04/19/2024 Negative  Negative Final    Blood, UA 04/19/2024 Trace (A)  Negative Final    Lot Number 04/19/2024 [id number]   Final    Expiration Date 04/19/2024 1,142,025   Final   Office Visit on 04/02/2024   Component Date Value Ref Range Status    SARS Antigen 04/02/2024 Detected (A)  Not Detected, Presumptive  Negative Final    Internal Control 04/02/2024 Passed  Passed Final    Lot Number 04/02/2024 [id number]   Final    Expiration Date 04/02/2024 2024-7-11   Final    Rapid Influenza A Ag 04/02/2024 Negative  Negative Final    Rapid Influenza B Ag 04/02/2024 Negative  Negative Final    Internal Control 04/02/2024 Passed  Passed Final    Lot Number 04/02/2024 [id number]   Final    Expiration Date 04/02/2024 2027-07-31   Final       Assessment and Plan  Diagnoses and all orders for this visit:    1. Generalized abdominal pain (Primary)  -     CT Abdomen Pelvis Without Contrast; Future    2. Diarrhea of presumed infectious origin  -     Stool Culture (Reference Lab) - Stool, Per Rectum; Future  -     Gastrointestinal Panel, PCR - Stool, Per Rectum; Future  -     Clostridioides difficile Toxin, PCR - Stool, Per Rectum; Future    3. Left sided colitis with unspecified complications  -     Gastrointestinal Panel, PCR - Stool, Per Rectum; Future  -     Clostridioides difficile Toxin, PCR - Stool, Per Rectum; Future    Patient has continued persistent abdominal pain, despite treatment. LLQ pain, on abdominal exam. Will get CT abdomen/pelvis.   Patient has had persistent diarrhea, will order infectious panel to rule out C. Diff and GI infections.   Same as above.     BMI is within normal parameters. No other follow-up for BMI required.       New Medications:   No orders of the defined types were placed in this encounter.      Discontinued Medications:   There are no discontinued medications.              Follow Up:   Return in about 3 weeks (around 9/16/2024).    Patient was given instructions and counseling regarding his condition or for health maintenance advice. Please see specific information pulled into the AVS if appropriate.       This document has been electronically signed by Diogo Faust DO   August 26, 2024 16:09 EDT    Dictated Utilizing Dragon Dictation: Part of this note may be an electronic  transcription/translation of spoken language to printed text using the Dragon Dictation System.

## 2024-08-27 ENCOUNTER — TELEPHONE (OUTPATIENT)
Dept: FAMILY MEDICINE CLINIC | Facility: CLINIC | Age: 84
End: 2024-08-27
Payer: MEDICARE

## 2024-08-27 ENCOUNTER — LAB (OUTPATIENT)
Dept: LAB | Facility: HOSPITAL | Age: 84
End: 2024-08-27
Payer: MEDICARE

## 2024-08-27 ENCOUNTER — DOCUMENTATION (OUTPATIENT)
Dept: FAMILY MEDICINE CLINIC | Facility: CLINIC | Age: 84
End: 2024-08-27
Payer: MEDICARE

## 2024-08-27 DIAGNOSIS — R19.7 DIARRHEA OF PRESUMED INFECTIOUS ORIGIN: ICD-10-CM

## 2024-08-27 DIAGNOSIS — K51.519 LEFT SIDED COLITIS WITH UNSPECIFIED COMPLICATIONS: ICD-10-CM

## 2024-08-27 LAB
027 TOXIN: ABNORMAL
ADV 40+41 DNA STL QL NAA+NON-PROBE: NOT DETECTED
ASTRO TYP 1-8 RNA STL QL NAA+NON-PROBE: NOT DETECTED
C CAYETANENSIS DNA STL QL NAA+NON-PROBE: NOT DETECTED
C COLI+JEJ+UPSA DNA STL QL NAA+NON-PROBE: NOT DETECTED
C DIFF GDH + TOXINS A+B STL QL IA.RAPID: NEGATIVE
C DIFF TOX GENS STL QL NAA+PROBE: POSITIVE
CRYPTOSP DNA STL QL NAA+NON-PROBE: NOT DETECTED
E HISTOLYT DNA STL QL NAA+NON-PROBE: NOT DETECTED
EAEC PAA PLAS AGGR+AATA ST NAA+NON-PRB: NOT DETECTED
EC STX1+STX2 GENES STL QL NAA+NON-PROBE: NOT DETECTED
EPEC EAE GENE STL QL NAA+NON-PROBE: NOT DETECTED
ETEC LTA+ST1A+ST1B TOX ST NAA+NON-PROBE: NOT DETECTED
G LAMBLIA DNA STL QL NAA+NON-PROBE: NOT DETECTED
NOROVIRUS GI+II RNA STL QL NAA+NON-PROBE: NOT DETECTED
P SHIGELLOIDES DNA STL QL NAA+NON-PROBE: NOT DETECTED
RVA RNA STL QL NAA+NON-PROBE: NOT DETECTED
S ENT+BONG DNA STL QL NAA+NON-PROBE: NOT DETECTED
SAPO I+II+IV+V RNA STL QL NAA+NON-PROBE: NOT DETECTED
SHIGELLA SP+EIEC IPAH ST NAA+NON-PROBE: NOT DETECTED
V CHOL+PARA+VUL DNA STL QL NAA+NON-PROBE: NOT DETECTED
V CHOLERAE DNA STL QL NAA+NON-PROBE: NOT DETECTED
Y ENTEROCOL DNA STL QL NAA+NON-PROBE: NOT DETECTED

## 2024-08-27 PROCEDURE — 87427 SHIGA-LIKE TOXIN AG IA: CPT

## 2024-08-27 PROCEDURE — 87045 FECES CULTURE AEROBIC BACT: CPT

## 2024-08-27 PROCEDURE — 87046 STOOL CULTR AEROBIC BACT EA: CPT

## 2024-08-27 PROCEDURE — 87493 C DIFF AMPLIFIED PROBE: CPT

## 2024-08-27 PROCEDURE — 87507 IADNA-DNA/RNA PROBE TQ 12-25: CPT

## 2024-08-27 PROCEDURE — 87449 NOS EACH ORGANISM AG IA: CPT

## 2024-08-27 RX ORDER — METRONIDAZOLE 500 MG/1
500 TABLET ORAL 3 TIMES DAILY
Status: CANCELLED
Start: 2024-08-27

## 2024-08-28 DIAGNOSIS — A04.72 CLOSTRIDIOIDES DIFFICILE DIARRHEA: Primary | ICD-10-CM

## 2024-08-28 RX ORDER — VANCOMYCIN HYDROCHLORIDE 125 MG/1
125 CAPSULE ORAL 4 TIMES DAILY
Qty: 40 CAPSULE | Refills: 0 | Status: SHIPPED | OUTPATIENT
Start: 2024-08-28 | End: 2024-09-07

## 2024-08-28 NOTE — PROGRESS NOTES
83 year old male, has came to me several times over the past few months with complaints of abdominal bloating, pain and diarrhea. Patient had stool studies done at are last visit. He is toxigenic C. Diff positive by PCR and 027 toxin negative. Due to the patients symptoms I will treat with oral vancomycin 125mg QID x 10 days. He is pending a CT of his abdomen as well to further evaluate.

## 2024-09-01 LAB
BACTERIA SPEC CULT: NORMAL
BACTERIA SPEC CULT: NORMAL
CAMPYLOBACTER STL CULT: NORMAL
E COLI SXT STL QL IA: NEGATIVE
SALM + SHIG STL CULT: NORMAL

## 2024-09-04 ENCOUNTER — HOSPITAL ENCOUNTER (OUTPATIENT)
Dept: CT IMAGING | Facility: HOSPITAL | Age: 84
Discharge: HOME | End: 2024-09-04
Admitting: STUDENT IN AN ORGANIZED HEALTH CARE EDUCATION/TRAINING PROGRAM
Payer: MEDICARE

## 2024-09-04 DIAGNOSIS — R10.84 GENERALIZED ABDOMINAL PAIN: ICD-10-CM

## 2024-09-04 PROCEDURE — 74176 CT ABD & PELVIS W/O CONTRAST: CPT

## 2024-09-05 ENCOUNTER — TELEPHONE (OUTPATIENT)
Dept: FAMILY MEDICINE CLINIC | Facility: CLINIC | Age: 84
End: 2024-09-05
Payer: MEDICARE

## 2024-09-05 NOTE — TELEPHONE ENCOUNTER
----- Message from Diogo Faust sent at 9/5/2024 10:54 AM EDT -----  Please let the patient know that his CT is unremarkable. Ask how is feeling since starting the antibiotics.

## 2024-09-05 NOTE — TELEPHONE ENCOUNTER
Spoke with patient & he verbalized understanding he reports he had to stop the antibiotic due to worsening Diarrhea with the antibiotics after stopping the antibiotics his Diarrhea improved,had a formed stool this am but still feels a pressure in his lower abdomen. Wants to know if he is going to be scheduled for a Colonoscopy? & if he should restart the antibiotics?

## 2024-09-06 NOTE — TELEPHONE ENCOUNTER
Yes he needs to restart antibiotics. I have sent a referral to Dr. Clement and he still should have a Colonoscopy.

## 2024-09-09 ENCOUNTER — OFFICE VISIT (OUTPATIENT)
Dept: FAMILY MEDICINE CLINIC | Facility: CLINIC | Age: 84
End: 2024-09-09
Payer: MEDICARE

## 2024-09-09 VITALS
BODY MASS INDEX: 22.45 KG/M2 | HEART RATE: 60 BPM | WEIGHT: 156.8 LBS | TEMPERATURE: 96.8 F | SYSTOLIC BLOOD PRESSURE: 122 MMHG | DIASTOLIC BLOOD PRESSURE: 76 MMHG | HEIGHT: 70 IN | OXYGEN SATURATION: 95 %

## 2024-09-09 DIAGNOSIS — A04.72 CLOSTRIDIOIDES DIFFICILE DIARRHEA: Primary | ICD-10-CM

## 2024-09-09 PROCEDURE — 99213 OFFICE O/P EST LOW 20 MIN: CPT | Performed by: STUDENT IN AN ORGANIZED HEALTH CARE EDUCATION/TRAINING PROGRAM

## 2024-09-09 PROCEDURE — 1126F AMNT PAIN NOTED NONE PRSNT: CPT | Performed by: STUDENT IN AN ORGANIZED HEALTH CARE EDUCATION/TRAINING PROGRAM

## 2024-09-09 NOTE — PROGRESS NOTES
Chief Complaint  Ear Fullness (left)    HPI:   OLU Jordan is a 83 y.o. male who presents today to [facility] for Ear Fullness (left). Patient seen in follow up for chronic diarrhea. He was had a positive c. Diff pcr.. He has been taking oral vancomycin. Patient reports that his diarrhea is improving but has not resolved.     Previous History:   Past Medical History:   Diagnosis Date    Acid reflux     Arthritis     Environmental allergies     Hypertension     STATES NO HIGH BLOOD PRESSURE. BUT XTRA VEIN IN HEART    Kidney stones     Macular degeneration       Past Surgical History:   Procedure Laterality Date    ESOPHAGEAL DILATATION      HERNIA REPAIR      KNEE ARTHROSCOPY Right     KNEE SURGERY      TOTAL HIP ARTHROPLASTY Left 8/9/2016    Procedure: TOTAL HIP ARTHROPLASTY;  Surgeon: Yoel Menjivar MD;  Location: [facility];  Service:     TOTAL HIP ARTHROPLASTY        Social History     Socioeconomic History    Marital status:    Tobacco Use    Smoking status: Never    Smokeless tobacco: Current     Types: Chew   Vaping Use    Vaping status: Never Used   Substance and Sexual Activity    Alcohol use: No    Drug use: No    Sexual activity: Defer      Health Maintenance Due   Topic Date Due    TDAP/TD VACCINES (1 - Tdap) Never done    ZOSTER VACCINE (1 of 2) Never done    COVID-19 Vaccine (3 - 2023-24 season) 09/01/2024    INFLUENZA VACCINE  08/01/2024        Current Medications:  Current Outpatient Medications   Medication Sig Dispense Refill    aspirin 81 MG chewable tablet Chew 2 tablets Daily.      cetirizine (zyrTEC) 10 MG tablet Take 0.5 tablets by mouth Daily. (Patient taking differently: Take 1 tablet by mouth Daily.) 30 tablet 2    Cholecalciferol (Vitamin D3) 50 MCG (2000 UT) capsule Take 1 capsule by mouth Daily. 30 capsule 11    Krill Oil 500 MG capsule Take 1 capsule by mouth Daily. 90 capsule 11    metoprolol tartrate (LOPRESSOR) 50 MG tablet TAKE  "1/2 TABLET BY MOUTH TWICE DAILY 90 tablet 1    Multiple Vitamins-Minerals (VITEYES AREDS FORMULA/LUTEIN) capsule Take 1 capsule by mouth daily.      ondansetron ODT (ZOFRAN-ODT) 4 MG disintegrating tablet Place 1 tablet on the tongue Every 8 (Eight) Hours As Needed for Nausea or Vomiting. 30 tablet 0    tadalafil (Cialis) 5 MG tablet Take 1 tablet by mouth Daily As Needed for Erectile Dysfunction. 30 tablet 2     No current facility-administered medications for this visit.       Allergies:   No Known Allergies    Vitals:   /76 (BP Location: Right arm, Patient Position: Sitting, Cuff Size: Adult)   Pulse 60   Temp 96.8 °F (36 °C) (Temporal)   Ht 177.8 cm (70\")   Wt 71.1 kg (156 lb 12.8 oz)   SpO2 95%   BMI 22.50 kg/m²     Estimated body mass index is 22.5 kg/m² as calculated from the following:    Height as of this encounter: 177.8 cm (70\").    Weight as of this encounter: 71.1 kg (156 lb 12.8 oz).    Dustin Jordan  reports that he has never smoked. His smokeless tobacco use includes chew.         Physical Exam:   Physical Exam  Constitutional:       Appearance: Normal appearance.   HENT:      Mouth/Throat:      Mouth: Mucous membranes are moist.   Cardiovascular:      Rate and Rhythm: Normal rate and regular rhythm.   Pulmonary:      Effort: Pulmonary effort is normal.      Breath sounds: Normal breath sounds. No wheezing or rhonchi.   Musculoskeletal:         General: Normal range of motion.   Skin:     General: Skin is warm and dry.   Neurological:      Mental Status: He is alert.   Psychiatric:         Mood and Affect: Mood normal.          Lab Results:   Lab on 08/27/2024   Component Date Value Ref Range Status    Toxigenic C. difficile by PCR 08/27/2024 Positive (A)  Negative Final    027 Toxin 08/27/2024 Presumptive Negative   Final    Salmonella/Shigella Screen 08/27/2024 Final report   Final    Result 1 08/27/2024 Comment   Final    No Salmonella or Shigella recovered.    Campylobacter Culture " 08/27/2024 Final report   Final    Result 1 08/27/2024 Comment   Final    No Campylobacter species isolated.    E coli, Shiga toxin Assay 08/27/2024 Negative  Negative Final    Campylobacter 08/27/2024 Not Detected  Not Detected Final    Plesiomonas shigelloides 08/27/2024 Not Detected  Not Detected Final    Salmonella 08/27/2024 Not Detected  Not Detected Final    Vibrio 08/27/2024 Not Detected  Not Detected Final    Vibrio cholerae 08/27/2024 Not Detected  Not Detected Final    Yersinia enterocolitica 08/27/2024 Not Detected  Not Detected Final    Enteroaggregative E. coli (EAEC) 08/27/2024 Not Detected  Not Detected Final    Enteropathogenic E. coli (EPEC) 08/27/2024 Not Detected  Not Detected Final    Enterotoxigenic E. coli (ETEC) lt/* 08/27/2024 Not Detected  Not Detected Final    Shiga-like toxin-producing E. coli* 08/27/2024 Not Detected  Not Detected Final    Shigella/Enteroinvasive E. coli (E* 08/27/2024 Not Detected  Not Detected Final    Cryptosporidium 08/27/2024 Not Detected  Not Detected Final    Cyclospora cayetanensis 08/27/2024 Not Detected  Not Detected Final    Entamoeba histolytica 08/27/2024 Not Detected  Not Detected Final    Giardia lamblia 08/27/2024 Not Detected  Not Detected Final    Adenovirus F40/41 08/27/2024 Not Detected  Not Detected Final    Astrovirus 08/27/2024 Not Detected  Not Detected Final    Norovirus GI/GII 08/27/2024 Not Detected  Not Detected Final    Rotavirus A 08/27/2024 Not Detected  Not Detected Final    Sapovirus (I, II, IV or V) 08/27/2024 Not Detected  Not Detected Final    C.diff Toxin Ag 08/27/2024 Negative  Negative Final   Lab on 08/06/2024   Component Date Value Ref Range Status    Glucose 08/06/2024 88  65 - 99 mg/dL Final    BUN 08/06/2024 10  8 - 23 mg/dL Final    Creatinine 08/06/2024 0.78  0.76 - 1.27 mg/dL Final    Sodium 08/06/2024 136  136 - 145 mmol/L Final    Potassium 08/06/2024 5.4 (H)  3.5 - 5.2 mmol/L Final    Chloride 08/06/2024 100  98 - 107  mmol/L Final    CO2 08/06/2024 25.0  22.0 - 29.0 mmol/L Final    Calcium 08/06/2024 9.3  8.6 - 10.5 mg/dL Final    Total Protein 08/06/2024 6.3  6.0 - 8.5 g/dL Final    Albumin 08/06/2024 4.4  3.5 - 5.2 g/dL Final    ALT (SGPT) 08/06/2024 16  1 - 41 U/L Final    AST (SGOT) 08/06/2024 22  1 - 40 U/L Final    Alkaline Phosphatase 08/06/2024 92  39 - 117 U/L Final    Total Bilirubin 08/06/2024 0.7  0.0 - 1.2 mg/dL Final    Globulin 08/06/2024 1.9  gm/dL Final    A/G Ratio 08/06/2024 2.3  g/dL Final    BUN/Creatinine Ratio 08/06/2024 12.8  7.0 - 25.0 Final    Anion Gap 08/06/2024 11.0  5.0 - 15.0 mmol/L Final    eGFR 08/06/2024 89.0  >60.0 mL/min/1.73 Final    TSH 08/06/2024 3.300  0.270 - 4.200 uIU/mL Final   Office Visit on 04/19/2024   Component Date Value Ref Range Status    Color 04/19/2024 Yellow  Yellow, Straw, Dark Yellow, Lizbeth Final    Clarity, UA 04/19/2024 Slightly Cloudy (A)  Clear Final    Specific Gravity  04/19/2024 1.015  1.005 - 1.030 Final    pH, Urine 04/19/2024 6.0  5.0 - 8.0 Final    Leukocytes 04/19/2024 Negative  Negative Final    Nitrite, UA 04/19/2024 Negative  Negative Final    Protein, POC 04/19/2024 Negative  Negative mg/dL Final    Glucose, UA 04/19/2024 Negative  Negative mg/dL Final    Ketones, UA 04/19/2024 Negative  Negative Final    Urobilinogen, UA 04/19/2024 Normal  Normal, 0.2 E.U./dL Final    Bilirubin 04/19/2024 Negative  Negative Final    Blood, UA 04/19/2024 Trace (A)  Negative Final    Lot Number 04/19/2024 [id number]   Final    Expiration Date 04/19/2024 1,142,682   Final   Office Visit on 04/02/2024   Component Date Value Ref Range Status    SARS Antigen 04/02/2024 Detected (A)  Not Detected, Presumptive Negative Final    Internal Control 04/02/2024 Passed  Passed Final    Lot Number 04/02/2024 [id number]   Final    Expiration Date 04/02/2024 2024-7-11   Final    Rapid Influenza A Ag 04/02/2024 Negative  Negative Final    Rapid Influenza B Ag 04/02/2024 Negative  Negative  Final    Internal Control 04/02/2024 Passed  Passed Final    Lot Number 04/02/2024 [id number]   Final    Expiration Date 04/02/2024 2027-07-31   Final       Assessment and Plan  Diagnoses and all orders for this visit:    1. Clostridioides difficile diarrhea (Primary)    Patient has C. Diff pcr positive, toxin negative. Due to chronic diarrhea I will continue to treat with vancomycin. Follow up in 4 weeks to see if symptoms have resolved. If not resolved will repeat testing and consider fidoxamycin. CT of the abdomen was negative for acute findings.     BMI is within normal parameters. No other follow-up for BMI required.       New Medications:   No orders of the defined types were placed in this encounter.      Discontinued Medications:   There are no discontinued medications.              Follow Up:   Return in about 4 weeks (around 10/7/2024).    Patient was given instructions and counseling regarding his condition or for health maintenance advice. Please see specific information pulled into the AVS if appropriate.       This document has been electronically signed by Diogo Faust DO   September 9, 2024 14:59 EDT    Dictated Utilizing Dragon Dictation: Part of this note may be an electronic transcription/translation of spoken language to printed text using the Dragon Dictation System.

## 2024-09-17 ENCOUNTER — TELEPHONE (OUTPATIENT)
Dept: FAMILY MEDICINE CLINIC | Facility: CLINIC | Age: 84
End: 2024-09-17
Payer: MEDICARE

## 2024-10-07 ENCOUNTER — OFFICE VISIT (OUTPATIENT)
Dept: FAMILY MEDICINE CLINIC | Facility: CLINIC | Age: 84
End: 2024-10-07
Payer: MEDICARE

## 2024-10-07 VITALS
TEMPERATURE: 96.9 F | SYSTOLIC BLOOD PRESSURE: 124 MMHG | HEIGHT: 70 IN | BODY MASS INDEX: 22.36 KG/M2 | HEART RATE: 60 BPM | OXYGEN SATURATION: 95 % | WEIGHT: 156.2 LBS | DIASTOLIC BLOOD PRESSURE: 72 MMHG

## 2024-10-07 DIAGNOSIS — A04.72 CLOSTRIDIOIDES DIFFICILE DIARRHEA: Primary | ICD-10-CM

## 2024-10-07 DIAGNOSIS — Z12.5 ENCOUNTER FOR SCREENING FOR MALIGNANT NEOPLASM OF PROSTATE: ICD-10-CM

## 2024-10-07 PROCEDURE — 99213 OFFICE O/P EST LOW 20 MIN: CPT | Performed by: STUDENT IN AN ORGANIZED HEALTH CARE EDUCATION/TRAINING PROGRAM

## 2024-10-07 PROCEDURE — 1126F AMNT PAIN NOTED NONE PRSNT: CPT | Performed by: STUDENT IN AN ORGANIZED HEALTH CARE EDUCATION/TRAINING PROGRAM

## 2024-10-07 NOTE — PROGRESS NOTES
Chief Complaint  Clostridioides difficile diarrhea    HPI:   OLU Jordan is a 84 y.o. male who presents today to [facility] for Clostridioides difficile diarrhea. Patient presents for follow up of chronic diarrhea eventually identified as C.diff. He was treated with oral vancomycin. He reports significant improvement since his last visit.     Previous History:   Past Medical History:   Diagnosis Date    Acid reflux     Arthritis     Environmental allergies     Hypertension     STATES NO HIGH BLOOD PRESSURE. BUT XTRA VEIN IN HEART    Kidney stones     Macular degeneration       Past Surgical History:   Procedure Laterality Date    ESOPHAGEAL DILATATION      HERNIA REPAIR      KNEE ARTHROSCOPY Right     KNEE SURGERY      TOTAL HIP ARTHROPLASTY Left 8/9/2016    Procedure: TOTAL HIP ARTHROPLASTY;  Surgeon: Yoel Menjivar MD;  Location: [facility];  Service:     TOTAL HIP ARTHROPLASTY        Social History     Socioeconomic History    Marital status:    Tobacco Use    Smoking status: Never    Smokeless tobacco: Current     Types: Chew   Vaping Use    Vaping status: Never Used   Substance and Sexual Activity    Alcohol use: No    Drug use: No    Sexual activity: Defer      Health Maintenance Due   Topic Date Due    TDAP/TD VACCINES (1 - Tdap) Never done    ZOSTER VACCINE (1 of 2) Never done    COVID-19 Vaccine (3 - 2023-24 season) 09/01/2024        Current Medications:  Current Outpatient Medications   Medication Sig Dispense Refill    aspirin 81 MG chewable tablet Chew 2 tablets Daily.      Cholecalciferol (Vitamin D3) 50 MCG (2000 UT) capsule Take 1 capsule by mouth Daily. 30 capsule 11    Krill Oil 500 MG capsule Take 1 capsule by mouth Daily. 90 capsule 11    metoprolol tartrate (LOPRESSOR) 50 MG tablet TAKE 1/2 TABLET BY MOUTH TWICE DAILY 90 tablet 1    Multiple Vitamins-Minerals (VITEYES AREDS FORMULA/LUTEIN) capsule Take 1 capsule by mouth daily.       "ondansetron ODT (ZOFRAN-ODT) 4 MG disintegrating tablet Place 1 tablet on the tongue Every 8 (Eight) Hours As Needed for Nausea or Vomiting. 30 tablet 0    tadalafil (Cialis) 5 MG tablet Take 1 tablet by mouth Daily As Needed for Erectile Dysfunction. 30 tablet 2    cetirizine (zyrTEC) 10 MG tablet Take 0.5 tablets by mouth Daily. (Patient not taking: Reported on 10/7/2024) 30 tablet 2     No current facility-administered medications for this visit.       Allergies:   No Known Allergies    Vitals:   /72 (BP Location: Right arm, Patient Position: Sitting, Cuff Size: Adult)   Pulse 60   Temp 96.9 °F (36.1 °C) (Temporal)   Ht 177.8 cm (70\")   Wt 70.9 kg (156 lb 3.2 oz)   SpO2 95%   BMI 22.41 kg/m²     Estimated body mass index is 22.41 kg/m² as calculated from the following:    Height as of this encounter: 177.8 cm (70\").    Weight as of this encounter: 70.9 kg (156 lb 3.2 oz).    Dustin Jordan  reports that he has never smoked. His smokeless tobacco use includes chew.          Physical Exam:   Physical Exam  Constitutional:       Appearance: Normal appearance.   HENT:      Mouth/Throat:      Mouth: Mucous membranes are moist.   Cardiovascular:      Rate and Rhythm: Normal rate and regular rhythm.   Pulmonary:      Effort: Pulmonary effort is normal.      Breath sounds: Normal breath sounds. No wheezing or rhonchi.   Musculoskeletal:         General: Normal range of motion.   Skin:     General: Skin is warm and dry.   Neurological:      Mental Status: He is alert.   Psychiatric:         Mood and Affect: Mood normal.          Lab Results:   Lab on 08/27/2024   Component Date Value Ref Range Status    Toxigenic C. difficile by PCR 08/27/2024 Positive (A)  Negative Final    027 Toxin 08/27/2024 Presumptive Negative   Final    Salmonella/Shigella Screen 08/27/2024 Final report   Final    Result 1 08/27/2024 Comment   Final    No Salmonella or Shigella recovered.    Campylobacter Culture 08/27/2024 Final report   " Final    Result 1 08/27/2024 Comment   Final    No Campylobacter species isolated.    E coli, Shiga toxin Assay 08/27/2024 Negative  Negative Final    Campylobacter 08/27/2024 Not Detected  Not Detected Final    Plesiomonas shigelloides 08/27/2024 Not Detected  Not Detected Final    Salmonella 08/27/2024 Not Detected  Not Detected Final    Vibrio 08/27/2024 Not Detected  Not Detected Final    Vibrio cholerae 08/27/2024 Not Detected  Not Detected Final    Yersinia enterocolitica 08/27/2024 Not Detected  Not Detected Final    Enteroaggregative E. coli (EAEC) 08/27/2024 Not Detected  Not Detected Final    Enteropathogenic E. coli (EPEC) 08/27/2024 Not Detected  Not Detected Final    Enterotoxigenic E. coli (ETEC) lt/* 08/27/2024 Not Detected  Not Detected Final    Shiga-like toxin-producing E. coli* 08/27/2024 Not Detected  Not Detected Final    Shigella/Enteroinvasive E. coli (E* 08/27/2024 Not Detected  Not Detected Final    Cryptosporidium 08/27/2024 Not Detected  Not Detected Final    Cyclospora cayetanensis 08/27/2024 Not Detected  Not Detected Final    Entamoeba histolytica 08/27/2024 Not Detected  Not Detected Final    Giardia lamblia 08/27/2024 Not Detected  Not Detected Final    Adenovirus F40/41 08/27/2024 Not Detected  Not Detected Final    Astrovirus 08/27/2024 Not Detected  Not Detected Final    Norovirus GI/GII 08/27/2024 Not Detected  Not Detected Final    Rotavirus A 08/27/2024 Not Detected  Not Detected Final    Sapovirus (I, II, IV or V) 08/27/2024 Not Detected  Not Detected Final    C.diff Toxin Ag 08/27/2024 Negative  Negative Final   Lab on 08/06/2024   Component Date Value Ref Range Status    Glucose 08/06/2024 88  65 - 99 mg/dL Final    BUN 08/06/2024 10  8 - 23 mg/dL Final    Creatinine 08/06/2024 0.78  0.76 - 1.27 mg/dL Final    Sodium 08/06/2024 136  136 - 145 mmol/L Final    Potassium 08/06/2024 5.4 (H)  3.5 - 5.2 mmol/L Final    Chloride 08/06/2024 100  98 - 107 mmol/L Final    CO2  08/06/2024 25.0  22.0 - 29.0 mmol/L Final    Calcium 08/06/2024 9.3  8.6 - 10.5 mg/dL Final    Total Protein 08/06/2024 6.3  6.0 - 8.5 g/dL Final    Albumin 08/06/2024 4.4  3.5 - 5.2 g/dL Final    ALT (SGPT) 08/06/2024 16  1 - 41 U/L Final    AST (SGOT) 08/06/2024 22  1 - 40 U/L Final    Alkaline Phosphatase 08/06/2024 92  39 - 117 U/L Final    Total Bilirubin 08/06/2024 0.7  0.0 - 1.2 mg/dL Final    Globulin 08/06/2024 1.9  gm/dL Final    A/G Ratio 08/06/2024 2.3  g/dL Final    BUN/Creatinine Ratio 08/06/2024 12.8  7.0 - 25.0 Final    Anion Gap 08/06/2024 11.0  5.0 - 15.0 mmol/L Final    eGFR 08/06/2024 89.0  >60.0 mL/min/1.73 Final    TSH 08/06/2024 3.300  0.270 - 4.200 uIU/mL Final   Office Visit on 04/19/2024   Component Date Value Ref Range Status    Color 04/19/2024 Yellow  Yellow, Straw, Dark Yellow, Lizbeth Final    Clarity, UA 04/19/2024 Slightly Cloudy (A)  Clear Final    Specific Gravity  04/19/2024 1.015  1.005 - 1.030 Final    pH, Urine 04/19/2024 6.0  5.0 - 8.0 Final    Leukocytes 04/19/2024 Negative  Negative Final    Nitrite, UA 04/19/2024 Negative  Negative Final    Protein, POC 04/19/2024 Negative  Negative mg/dL Final    Glucose, UA 04/19/2024 Negative  Negative mg/dL Final    Ketones, UA 04/19/2024 Negative  Negative Final    Urobilinogen, UA 04/19/2024 Normal  Normal, 0.2 E.U./dL Final    Bilirubin 04/19/2024 Negative  Negative Final    Blood, UA 04/19/2024 Trace (A)  Negative Final    Lot Number 04/19/2024 [id number]   Final    Expiration Date 04/19/2024 1,142,025   Final       Assessment and Plan  Diagnoses and all orders for this visit:    1. Clostridioides difficile diarrhea (Primary)    2. Encounter for screening for malignant neoplasm of prostate  -     Ambulatory Referral to Gastroenterology    Treated appropriately with vancomycin, CT negative, symptoms resolved. No further workup or treatment. Patient would like to be referred for screening colonoscopy.   AS above    BMI is within  normal parameters. No other follow-up for BMI required.       New Medications:   No orders of the defined types were placed in this encounter.      Discontinued Medications:   There are no discontinued medications.              Follow Up:   No follow-ups on file.    Patient was given instructions and counseling regarding his condition or for health maintenance advice. Please see specific information pulled into the AVS if appropriate.       This document has been electronically signed by Diogo Faust DO   October 7, 2024 13:16 EDT    Dictated Utilizing Dragon Dictation: Part of this note may be an electronic transcription/translation of spoken language to printed text using the Dragon Dictation System.

## 2024-12-30 DIAGNOSIS — Z91.09 ENVIRONMENTAL ALLERGIES: ICD-10-CM

## 2024-12-31 RX ORDER — CETIRIZINE HYDROCHLORIDE 10 MG/1
5 TABLET ORAL DAILY
Qty: 90 TABLET | Refills: 1 | Status: SHIPPED | OUTPATIENT
Start: 2024-12-31

## 2025-01-09 DIAGNOSIS — Q24.5: ICD-10-CM

## 2025-01-09 RX ORDER — METOPROLOL TARTRATE 50 MG
25 TABLET ORAL DAILY
Qty: 90 TABLET | Refills: 1 | Status: SHIPPED | OUTPATIENT
Start: 2025-01-09

## 2025-02-10 ENCOUNTER — OFFICE VISIT (OUTPATIENT)
Dept: FAMILY MEDICINE CLINIC | Facility: CLINIC | Age: 85
End: 2025-02-10
Payer: MEDICARE

## 2025-02-10 ENCOUNTER — LAB (OUTPATIENT)
Dept: FAMILY MEDICINE CLINIC | Facility: CLINIC | Age: 85
End: 2025-02-10
Payer: MEDICARE

## 2025-02-10 VITALS
RESPIRATION RATE: 16 BRPM | BODY MASS INDEX: 22.5 KG/M2 | DIASTOLIC BLOOD PRESSURE: 68 MMHG | HEIGHT: 70 IN | OXYGEN SATURATION: 96 % | WEIGHT: 157.2 LBS | HEART RATE: 59 BPM | TEMPERATURE: 97.7 F | SYSTOLIC BLOOD PRESSURE: 134 MMHG

## 2025-02-10 DIAGNOSIS — Z23 IMMUNIZATION DUE: ICD-10-CM

## 2025-02-10 DIAGNOSIS — Z12.5 ENCOUNTER FOR SCREENING FOR MALIGNANT NEOPLASM OF PROSTATE: ICD-10-CM

## 2025-02-10 DIAGNOSIS — A04.72 CLOSTRIDIOIDES DIFFICILE DIARRHEA: Primary | ICD-10-CM

## 2025-02-10 DIAGNOSIS — R35.0 BENIGN PROSTATIC HYPERPLASIA WITH URINARY FREQUENCY: ICD-10-CM

## 2025-02-10 DIAGNOSIS — Z13.220 ENCOUNTER FOR LIPID SCREENING FOR CARDIOVASCULAR DISEASE: ICD-10-CM

## 2025-02-10 DIAGNOSIS — N40.1 BENIGN PROSTATIC HYPERPLASIA WITH URINARY FREQUENCY: ICD-10-CM

## 2025-02-10 DIAGNOSIS — Z13.6 ENCOUNTER FOR LIPID SCREENING FOR CARDIOVASCULAR DISEASE: ICD-10-CM

## 2025-02-10 DIAGNOSIS — A04.72 CLOSTRIDIOIDES DIFFICILE DIARRHEA: ICD-10-CM

## 2025-02-10 DIAGNOSIS — H35.3223 EXUDATIVE AGE-RELATED MACULAR DEGENERATION, LEFT EYE, WITH INACTIVE SCAR: ICD-10-CM

## 2025-02-10 DIAGNOSIS — M46.96 UNSPECIFIED INFLAMMATORY SPONDYLOPATHY, LUMBAR REGION: ICD-10-CM

## 2025-02-10 LAB
ALBUMIN SERPL-MCNC: 4.3 G/DL (ref 3.5–5.2)
ALBUMIN/GLOB SERPL: 2.3 G/DL
ALP SERPL-CCNC: 88 U/L (ref 39–117)
ALT SERPL W P-5'-P-CCNC: 18 U/L (ref 1–41)
ANION GAP SERPL CALCULATED.3IONS-SCNC: 6 MMOL/L (ref 5–15)
AST SERPL-CCNC: 21 U/L (ref 1–40)
BASOPHILS # BLD AUTO: 0.03 10*3/MM3 (ref 0–0.2)
BASOPHILS NFR BLD AUTO: 0.5 % (ref 0–1.5)
BILIRUB SERPL-MCNC: 0.8 MG/DL (ref 0–1.2)
BUN SERPL-MCNC: 10 MG/DL (ref 8–23)
BUN/CREAT SERPL: 12.8 (ref 7–25)
CALCIUM SPEC-SCNC: 9 MG/DL (ref 8.6–10.5)
CHLORIDE SERPL-SCNC: 101 MMOL/L (ref 98–107)
CHOLEST SERPL-MCNC: 202 MG/DL (ref 0–200)
CO2 SERPL-SCNC: 28 MMOL/L (ref 22–29)
CREAT SERPL-MCNC: 0.78 MG/DL (ref 0.76–1.27)
DEPRECATED RDW RBC AUTO: 39.2 FL (ref 37–54)
EGFRCR SERPLBLD CKD-EPI 2021: 88.5 ML/MIN/1.73
EOSINOPHIL # BLD AUTO: 0.22 10*3/MM3 (ref 0–0.4)
EOSINOPHIL NFR BLD AUTO: 3.7 % (ref 0.3–6.2)
ERYTHROCYTE [DISTWIDTH] IN BLOOD BY AUTOMATED COUNT: 12 % (ref 12.3–15.4)
GLOBULIN UR ELPH-MCNC: 1.9 GM/DL
GLUCOSE SERPL-MCNC: 80 MG/DL (ref 65–99)
HCT VFR BLD AUTO: 45 % (ref 37.5–51)
HDLC SERPL-MCNC: 63 MG/DL (ref 40–60)
HGB BLD-MCNC: 15.6 G/DL (ref 13–17.7)
IMM GRANULOCYTES # BLD AUTO: 0.08 10*3/MM3 (ref 0–0.05)
IMM GRANULOCYTES NFR BLD AUTO: 1.4 % (ref 0–0.5)
LDLC SERPL CALC-MCNC: 128 MG/DL (ref 0–100)
LDLC/HDLC SERPL: 2.02 {RATIO}
LYMPHOCYTES # BLD AUTO: 0.9 10*3/MM3 (ref 0.7–3.1)
LYMPHOCYTES NFR BLD AUTO: 15.3 % (ref 19.6–45.3)
MCH RBC QN AUTO: 31.3 PG (ref 26.6–33)
MCHC RBC AUTO-ENTMCNC: 34.7 G/DL (ref 31.5–35.7)
MCV RBC AUTO: 90.2 FL (ref 79–97)
MONOCYTES # BLD AUTO: 0.53 10*3/MM3 (ref 0.1–0.9)
MONOCYTES NFR BLD AUTO: 9 % (ref 5–12)
NEUTROPHILS NFR BLD AUTO: 4.11 10*3/MM3 (ref 1.7–7)
NEUTROPHILS NFR BLD AUTO: 70.1 % (ref 42.7–76)
NRBC BLD AUTO-RTO: 0 /100 WBC (ref 0–0.2)
PLATELET # BLD AUTO: 165 10*3/MM3 (ref 140–450)
PMV BLD AUTO: 8.7 FL (ref 6–12)
POTASSIUM SERPL-SCNC: 4.6 MMOL/L (ref 3.5–5.2)
PROT SERPL-MCNC: 6.2 G/DL (ref 6–8.5)
PSA SERPL-MCNC: 1.28 NG/ML (ref 0–4)
RBC # BLD AUTO: 4.99 10*6/MM3 (ref 4.14–5.8)
SODIUM SERPL-SCNC: 135 MMOL/L (ref 136–145)
TRIGL SERPL-MCNC: 59 MG/DL (ref 0–150)
VLDLC SERPL-MCNC: 11 MG/DL (ref 5–40)
WBC NRBC COR # BLD AUTO: 5.87 10*3/MM3 (ref 3.4–10.8)

## 2025-02-10 PROCEDURE — 80061 LIPID PANEL: CPT | Performed by: FAMILY MEDICINE

## 2025-02-10 PROCEDURE — 36415 COLL VENOUS BLD VENIPUNCTURE: CPT

## 2025-02-10 PROCEDURE — G0103 PSA SCREENING: HCPCS | Performed by: FAMILY MEDICINE

## 2025-02-10 PROCEDURE — 85025 COMPLETE CBC W/AUTO DIFF WBC: CPT | Performed by: FAMILY MEDICINE

## 2025-02-10 PROCEDURE — 80053 COMPREHEN METABOLIC PANEL: CPT | Performed by: FAMILY MEDICINE

## 2025-02-10 RX ORDER — CHLORAL HYDRATE 500 MG
1000 CAPSULE ORAL
COMMUNITY

## 2025-03-03 NOTE — PROGRESS NOTES
"[facility]     VITALS: Blood pressure 134/68, pulse 59, temperature 97.7 °F (36.5 °C), temperature source Temporal, resp. rate 16, height 177.8 cm (70\"), weight 71.3 kg (157 lb 3.2 oz), SpO2 96%.    Subjective  Chief Complaint  Hypertension, C Diff Diarrhea, and Benign Prostatic Hypertrophy    Subjective          History of Present Illness:    History of Present Illness  The patient is an 84-year-old male with a medical history significant for hypertension, presenting to the clinic for a medical follow-up.    He reports a decrease in diarrhea symptoms, attributing this improvement to the use of probiotics prescribed by his gastroenterologist, Dr. Clement. He has been advised against undergoing a colonoscopy due to concerns about potential damage to his thin intestinal walls. His bowel movements have been inconsistent, with periods of constipation followed by episodes of diarrhea. Over the past few days, he has experienced more regular bowel movements. He also reports excessive gas production. He has a history of C. difficile infection and does not consume alcohol. He also takes Metamucil, which contains collagen and fiber, but does not use laxatives. His current regimen includes 2 probiotics and Metamucil daily. He also consumes kefir in the morning to alleviate stomach discomfort.    He is uncertain about the necessity of an ear cleaning procedure.    SOCIAL HISTORY  He does not drink alcohol.    MEDICATIONS  Current: Probiotic, Metamucil    IMMUNIZATIONS  He has received a pneumonia vaccine at [facility].    No complaints regarding medications.     The following portions of the patient's history were reviewed and updated as appropriate: allergies, current medications, past family history, past medical history, past social history, past surgical history and problem list.    Past Medical History  Past Medical History:   Diagnosis Date    Acid reflux     Arthritis     C. difficile diarrhea     Environmental allergies " "    Hypertension     STATES NO HIGH BLOOD PRESSURE. BUT XTRA VEIN IN HEART    Kidney stones     Macular degeneration        Surgical History  Past Surgical History:   Procedure Laterality Date    ESOPHAGEAL DILATATION      HERNIA REPAIR      KNEE ARTHROSCOPY Right     KNEE SURGERY      TOTAL HIP ARTHROPLASTY Left 8/9/2016    Procedure: TOTAL HIP ARTHROPLASTY;  Surgeon: Yoel Menjivar MD;  Location: [facility];  Service:     TOTAL HIP ARTHROPLASTY         Family History  Family History   Problem Relation Age of Onset    Osteoporosis Sister     Stroke Sister     Heart disease Brother     Other Mother         Arthritis, Gout, Pneumonia    Stroke Father        Social History  Social History     Socioeconomic History    Marital status:    Tobacco Use    Smoking status: Never    Smokeless tobacco: Current     Types: Chew   Vaping Use    Vaping status: Never Used   Substance and Sexual Activity    Alcohol use: No    Drug use: No    Sexual activity: Defer       Objective   Vital Signs:   /68 (BP Location: Right arm, Patient Position: Sitting, Cuff Size: Adult)   Pulse 59   Temp 97.7 °F (36.5 °C) (Temporal)   Resp 16   Ht 177.8 cm (70\")   Wt 71.3 kg (157 lb 3.2 oz)   SpO2 96%   BMI 22.56 kg/m²       Physical Exam     Physical Exam  Lungs were auscultated.    Gen: Patient in NAD. Pleasant and answers appropriately. A&Ox3.    Skin: Warm and dry with normal turgor. No purpura, rashes, or unusual pigmentation noted. Hair is normal in appearance and distribution.    HEENT: NC/AT. No lesions noted. Conjunctiva clear, sclera nonicteric. PERRL. EOMI without nystagmus or strabismus. Fundi appear benign. No hemorrhages or exudates of eyes. Auditory canals are patent bilaterally without lesions. TMs intact,  nonerythematous, nonbulging without lesions. Nasal mucosa pink, nonerythematous, and nonedematous. Frontal and maxillary sinuses are nontender. O/P nonerythematous and moist without exudate.    Neck: " Supple without lymph nodes palpated. FROM. No carotid bruits appreciated bilaterally.    Lungs: CTA B/L without rales, rhonchi, crackles, or wheezes.    Heart: RRR. S1 and S2 normal. No S3 or S4. No MRGT.    Abd: Soft, nontender,nondistended. (+)BSx4 quadrants.     Extrem: No CCE. Radial pulses 2+/4 and equal B/L. FROMx4. No bone, joint, or muscle tenderness noted.    Neuro: No focal motor/sensory deficits.    Procedures    Result Review :   The following data was reviewed by: Yane Estrada MD on 02/10/2025:       Results             Assessment and Plan      Dustin Jordan is a 84 y.o. here for medical followup.    Diagnoses and all orders for this visit:    1. Clostridioides difficile diarrhea (Primary)  -     CBC Auto Differential; Future  -     Comprehensive Metabolic Panel; Future    2. Benign prostatic hyperplasia with urinary frequency  -     CBC Auto Differential; Future  -     Comprehensive Metabolic Panel; Future    3. Encounter for lipid screening for cardiovascular disease  -     Lipid Panel; Future    4. Encounter for screening for malignant neoplasm of prostate  -     PSA Screen; Future    5. Exudative age-related macular degeneration, left eye, with inactive scar    6. Unspecified inflammatory spondylopathy, lumbar region    7. Immunization due  -     Tdap Vaccine => 6yo IM (BOOSTRIX/ADACEL)        Assessment & Plan  1. Hypertension.  He presents for a routine blood pressure check to ensure his condition remains stable.    2. Diarrhea.  His diarrhea has shown improvement over the past few days. He has been diagnosed with C. difficile, which has resulted in the depletion of beneficial bacteria in his system. The use of probiotics, kefir, and buttermilk is aimed at replenishing these beneficial bacteria. Given his age and the thinness of his tissue, it is anticipated that he may not experience significant improvement until June 2024. He is currently taking a probiotic and Metamucil as prescribed by  his gastroenterologist, Dr. Clement. He is advised to continue this regimen and monitor his symptoms.    3. Health Maintenance.  He will undergo a blood panel test today. He has received a pneumonia shot at [facility], but the exact date needs to be confirmed.      BMI is within normal parameters. No other follow-up for BMI required.          Patient or patient representative verbalized consent for the use of Ambient Listening during the visit with  Yane Estrada MD for chart documentation. 3/2/2025  21:52 EST        Follow Up   Return in about 6 months (around 8/10/2025), or LABS.  Findings and plans discussed with patient who verbalizes understanding and agreement. Will followup with patient once results are in. Patient was given instructions and counseling regarding his condition or for health maintenance advice. Please see specific information pulled into the AVS if appropriate.       Yane Estrada MD

## 2025-07-14 DIAGNOSIS — Q24.5: ICD-10-CM

## 2025-07-14 RX ORDER — METOPROLOL TARTRATE 50 MG
25 TABLET ORAL EVERY 12 HOURS SCHEDULED
Qty: 90 TABLET | Refills: 0 | Status: SHIPPED | OUTPATIENT
Start: 2025-07-14

## 2025-07-22 DIAGNOSIS — Z91.09 ENVIRONMENTAL ALLERGIES: ICD-10-CM

## 2025-07-22 RX ORDER — CETIRIZINE HYDROCHLORIDE 10 MG/1
10 TABLET ORAL DAILY
Qty: 90 TABLET | Refills: 1 | Status: SHIPPED | OUTPATIENT
Start: 2025-07-22

## 2025-08-11 ENCOUNTER — OFFICE VISIT (OUTPATIENT)
Dept: FAMILY MEDICINE CLINIC | Facility: CLINIC | Age: 85
End: 2025-08-11
Payer: MEDICARE

## 2025-08-11 VITALS
OXYGEN SATURATION: 98 % | BODY MASS INDEX: 21.99 KG/M2 | HEART RATE: 56 BPM | TEMPERATURE: 97.5 F | SYSTOLIC BLOOD PRESSURE: 138 MMHG | WEIGHT: 153.6 LBS | RESPIRATION RATE: 16 BRPM | HEIGHT: 70 IN | DIASTOLIC BLOOD PRESSURE: 74 MMHG

## 2025-08-11 DIAGNOSIS — H69.93 DYSFUNCTION OF BOTH EUSTACHIAN TUBES: ICD-10-CM

## 2025-08-11 DIAGNOSIS — Q24.5: ICD-10-CM

## 2025-08-11 DIAGNOSIS — H61.22 IMPACTED CERUMEN OF LEFT EAR: ICD-10-CM

## 2025-08-11 DIAGNOSIS — Z00.00 ENCOUNTER FOR SUBSEQUENT ANNUAL WELLNESS VISIT IN MEDICARE PATIENT: Primary | ICD-10-CM

## 2025-08-11 PROCEDURE — G0439 PPPS, SUBSEQ VISIT: HCPCS | Performed by: FAMILY MEDICINE

## 2025-08-11 PROCEDURE — 1125F AMNT PAIN NOTED PAIN PRSNT: CPT | Performed by: FAMILY MEDICINE

## 2025-08-11 RX ORDER — PSEUDOEPHEDRINE HYDROCHLORIDE 60 MG/1
60 TABLET, FILM COATED ORAL 2 TIMES DAILY
Qty: 60 TABLET | Refills: 0 | Status: SHIPPED | OUTPATIENT
Start: 2025-08-11

## 2025-08-25 PROCEDURE — 69209 REMOVE IMPACTED EAR WAX UNI: CPT | Performed by: FAMILY MEDICINE
